# Patient Record
Sex: MALE | Race: ASIAN | NOT HISPANIC OR LATINO | Employment: FULL TIME | URBAN - NONMETROPOLITAN AREA
[De-identification: names, ages, dates, MRNs, and addresses within clinical notes are randomized per-mention and may not be internally consistent; named-entity substitution may affect disease eponyms.]

---

## 2019-01-21 ENCOUNTER — TELEPHONE (OUTPATIENT)
Dept: INTERNAL MEDICINE | Facility: CLINIC | Age: 44
End: 2019-01-21

## 2019-01-21 NOTE — TELEPHONE ENCOUNTER
Patients wife is scheduled on 3/1/19 at 945 and pt had to schedule that next week, he is wondering if you would be able to work them both in on the same day.  It doesn't matter which day, just would need 2 new patient appointments back to back if possible.    Patients phone # [phone]

## 2019-03-01 ENCOUNTER — OFFICE VISIT (OUTPATIENT)
Dept: INTERNAL MEDICINE | Facility: CLINIC | Age: 44
End: 2019-03-01

## 2019-03-01 VITALS
BODY MASS INDEX: 27.77 KG/M2 | WEIGHT: 205 LBS | TEMPERATURE: 98.3 F | SYSTOLIC BLOOD PRESSURE: 169 MMHG | HEIGHT: 72 IN | HEART RATE: 59 BPM | OXYGEN SATURATION: 98 % | DIASTOLIC BLOOD PRESSURE: 108 MMHG

## 2019-03-01 DIAGNOSIS — K21.9 GASTROESOPHAGEAL REFLUX DISEASE, ESOPHAGITIS PRESENCE NOT SPECIFIED: ICD-10-CM

## 2019-03-01 DIAGNOSIS — J32.0 CHRONIC MAXILLARY SINUSITIS: ICD-10-CM

## 2019-03-01 DIAGNOSIS — Z00.00 ROUTINE GENERAL MEDICAL EXAMINATION AT A HEALTH CARE FACILITY: ICD-10-CM

## 2019-03-01 DIAGNOSIS — I10 HTN (HYPERTENSION), BENIGN: Primary | ICD-10-CM

## 2019-03-01 LAB
ALBUMIN SERPL-MCNC: 4.8 G/DL (ref 3.5–5)
ALBUMIN/GLOB SERPL: 1.7 G/DL (ref 1–2)
ALP SERPL-CCNC: 86 U/L (ref 38–126)
ALT SERPL-CCNC: 45 U/L (ref 13–69)
AST SERPL-CCNC: 39 U/L (ref 15–46)
BILIRUB SERPL-MCNC: 0.9 MG/DL (ref 0.2–1.3)
BUN SERPL-MCNC: 11 MG/DL (ref 7–20)
BUN/CREAT SERPL: 11 (ref 6.3–21.9)
CALCIUM SERPL-MCNC: 10.5 MG/DL (ref 8.4–10.2)
CHLORIDE SERPL-SCNC: 104 MMOL/L (ref 98–107)
CHOLEST SERPL-MCNC: 193 MG/DL (ref 0–199)
CO2 SERPL-SCNC: 29 MMOL/L (ref 26–30)
CREAT SERPL-MCNC: 1 MG/DL (ref 0.6–1.3)
ERYTHROCYTE [DISTWIDTH] IN BLOOD BY AUTOMATED COUNT: 13.5 % (ref 11.5–14.5)
GLOBULIN SER CALC-MCNC: 2.8 GM/DL
GLUCOSE SERPL-MCNC: 88 MG/DL (ref 74–98)
HCT VFR BLD AUTO: 51.1 % (ref 42–52)
HDLC SERPL-MCNC: 43 MG/DL (ref 40–60)
HGB BLD-MCNC: 16.4 G/DL (ref 14–18)
LDLC SERPL CALC-MCNC: 118 MG/DL (ref 0–99)
MCH RBC QN AUTO: 26.3 PG (ref 27–31)
MCHC RBC AUTO-ENTMCNC: 32.1 G/DL (ref 30–37)
MCV RBC AUTO: 82 FL (ref 80–94)
PLATELET # BLD AUTO: 288 10*3/MM3 (ref 130–400)
POTASSIUM SERPL-SCNC: 4.5 MMOL/L (ref 3.5–5.1)
PROT SERPL-MCNC: 7.6 G/DL (ref 6.3–8.2)
RBC # BLD AUTO: 6.23 10*6/MM3 (ref 4.7–6.1)
SODIUM SERPL-SCNC: 141 MMOL/L (ref 137–145)
TRIGL SERPL-MCNC: 162 MG/DL
VLDLC SERPL CALC-MCNC: 32.4 MG/DL
WBC # BLD AUTO: 6.33 10*3/MM3 (ref 4.8–10.8)

## 2019-03-01 PROCEDURE — 99386 PREV VISIT NEW AGE 40-64: CPT | Performed by: INTERNAL MEDICINE

## 2019-03-01 PROCEDURE — 93000 ELECTROCARDIOGRAM COMPLETE: CPT | Performed by: INTERNAL MEDICINE

## 2019-03-01 RX ORDER — OMEPRAZOLE 20 MG/1
20 CAPSULE, DELAYED RELEASE ORAL DAILY
Qty: 30 CAPSULE | Refills: 2 | Status: SHIPPED | OUTPATIENT
Start: 2019-03-01 | End: 2021-09-01 | Stop reason: DRUGHIGH

## 2019-03-01 RX ORDER — AMLODIPINE BESYLATE 5 MG/1
5 TABLET ORAL DAILY
Qty: 30 TABLET | Refills: 5 | Status: SHIPPED | OUTPATIENT
Start: 2019-03-01 | End: 2019-04-08 | Stop reason: SDUPTHER

## 2019-03-01 RX ORDER — FLUTICASONE PROPIONATE 50 MCG
1 SPRAY, SUSPENSION (ML) NASAL DAILY
Qty: 1 BOTTLE | Refills: 5 | Status: SHIPPED | OUTPATIENT
Start: 2019-03-01 | End: 2019-03-31

## 2019-03-01 RX ORDER — CETIRIZINE HYDROCHLORIDE 10 MG/1
10 TABLET ORAL DAILY
Qty: 30 TABLET | Refills: 5 | Status: SHIPPED | OUTPATIENT
Start: 2019-03-01 | End: 2019-10-05

## 2019-03-01 NOTE — PROGRESS NOTES
Chief Complaint   Patient presents with   • Annual Exam     New patient physical - fasting today - acid reflux        MENDOZA Larson is a 43 y.o. male and is here for a comprehensive physical exam. The patient reports problems - ht burn, dyspnea.  Has had a history of hypertension in the past however home readings have usually shown slightly better control     History:  Erectile dysfunction  no  Nocturia  no      Do you take any herbs or supplements that were not prescribed by a doctor? no    Are you taking aspirin daily? no      Health Habits:  Dental Exam. up to date  Eye Exam. up to date  Exercise: 2 times/week.  Current exercise activities include: housecleaning    Health Maintenance   Topic Date Due   • ANNUAL PHYSICAL  11/30/1978   • TDAP/TD VACCINES (1 - Tdap) 11/30/1994   • INFLUENZA VACCINE  08/01/2018       PMH, PSH, SocHx, FamHx, Allergies, and Medications: Reviewed and updated in the Visit Navigator.     No Known Allergies  Past Medical History:   Diagnosis Date   • GERD (gastroesophageal reflux disease)      Past Surgical History:   Procedure Laterality Date   • KIDNEY STONE SURGERY  2011     Social History     Socioeconomic History   • Marital status:      Spouse name: Not on file   • Number of children: Not on file   • Years of education: Not on file   • Highest education level: Not on file   Social Needs   • Financial resource strain: Not on file   • Food insecurity - worry: Not on file   • Food insecurity - inability: Not on file   • Transportation needs - medical: Not on file   • Transportation needs - non-medical: Not on file   Occupational History   • Not on file   Tobacco Use   • Smoking status: Never Smoker   • Smokeless tobacco: Never Used   Substance and Sexual Activity   • Alcohol use: No     Frequency: Never   • Drug use: No   • Sexual activity: Defer   Other Topics Concern   • Not on file   Social History Narrative   • Not on file     Family History   Problem Relation Age of  "Onset   • Diabetes Mother    • Hypertension Mother    • Heart disease Mother    • Prostate cancer Father        Review of Systems  Review of Systems   Constitutional: Negative.  Negative for activity change, appetite change, fatigue and fever.   HENT: Negative for congestion, ear discharge, ear pain and trouble swallowing.    Eyes: Negative for photophobia and visual disturbance.   Respiratory: Negative for cough and shortness of breath.    Cardiovascular: Negative for chest pain and palpitations.   Gastrointestinal: Negative for abdominal distention, abdominal pain, constipation, diarrhea, nausea and vomiting.   Endocrine: Negative.    Genitourinary: Negative for dysuria, hematuria and urgency.   Musculoskeletal: Positive for arthralgias. Negative for back pain, joint swelling and myalgias.   Skin: Negative for color change and rash.   Allergic/Immunologic: Negative.    Neurological: Negative for dizziness, weakness, light-headedness and headaches.   Hematological: Negative for adenopathy. Does not bruise/bleed easily.   Psychiatric/Behavioral: Positive for sleep disturbance. Negative for agitation, confusion and dysphoric mood. The patient is nervous/anxious.        Vitals:    03/01/19 1018   BP: (!) 169/108   Pulse: 59   Temp: 98.3 °F (36.8 °C)   SpO2: 98%       Objective   BP (!) 169/108 Comment: AUTOMATIC  Pulse 59   Temp 98.3 °F (36.8 °C) (Temporal)   Ht 182.9 cm (72\")   Wt 93 kg (205 lb)   SpO2 98%   BMI 27.80 kg/m²     Physical Exam   Constitutional: He is oriented to person, place, and time. He appears well-developed and well-nourished. No distress.   HENT:   Nose: Nose normal.   Mouth/Throat: Oropharynx is clear and moist.   Eyes: Conjunctivae and EOM are normal. No scleral icterus.   Neck: No tracheal deviation present. No thyromegaly present.   Cardiovascular: Normal rate and regular rhythm. Exam reveals no friction rub.   No murmur heard.  Pulmonary/Chest: No respiratory distress. He has no " wheezes. He has no rales.   Abdominal: Soft. He exhibits no distension and no mass. There is no tenderness. There is no guarding.   Musculoskeletal: Normal range of motion. He exhibits no deformity.   Lymphadenopathy:     He has no cervical adenopathy.   Neurological: He is alert and oriented to person, place, and time. He has normal reflexes. No cranial nerve deficit. Coordination normal.   Skin: Skin is warm and dry. No rash noted. No erythema.   Psychiatric: He has a normal mood and affect. His behavior is normal. Judgment and thought content normal.       The CVD Risk score (D'Agostino, et al., 2008) failed to calculate for the following reasons:    Cannot find a previous HDL lab    Cannot find a previous total cholesterol lab    No results found for: CHOL, CHLPL, TRIG, HDL, LDL, LDLDIRECT  No results found for: GLUCOSE, BUN, CREATININE, NA, K, CL, CO2, CALCIUM, PROTEINTOT, ALBUMIN, ALT, AST, ALKPHOS, BILITOT, EGFRIFNONA, LABIL2, BCR, ANIONGAP  No results found for: WBC, HGB, HCT, MCV, PLT    Assessment/Plan   1. Healthy male exam.    2. Patient Counseling: Including but not Limited to the following, when appropriate:  --Nutrition: Stressed importance of moderation in sodium/caffeine intake, saturated fat and cholesterol, caloric balance, sufficient intake of fresh fruits, vegetables, fiber  .--Discussed the issue of daily use of baby aspirin.  --Exercise: Stressed the importance of regular exercise.   --Substance Abuse: Discussed cessation/primary prevention of tobacco, alcohol, or other drug use; driving or other dangerous activities under the influence; availability of treatment for abuse, as indicated based on social history.    --Sexuality: Discussed sexually transmitted diseases, partner selection, use of condoms and contraceptive alternatives.   --Injury prevention: Discussed safety belts, safety helmets, smoke detector, smoking near bedding or upholstery.   --Dental health: Discussed importance of  regular tooth brushing, flossing, and dental visits.  --Immunizations reviewed.  --Discussed benefits of colon cancer screening.      3. Discussed the patient's BMI with him.  The BMI is in the acceptable range  4. No Follow-up on file.  5. Age-appropriate Screening Scheduled  6. There are no Patient Instructions on file for this visit.    Assessment/Plan     MENDOZA was seen today for annual exam.    Diagnoses and all orders for this visit:    HTN (hypertension), benign intermittent elevations noted counseled patient start amlodipine follow BP readings at home discussed DASH diet    Gastroesophageal reflux disease, esophagitis presence not specified discussed reflux precautions and proton pump inhibitors check routine lab work

## 2019-04-08 ENCOUNTER — OFFICE VISIT (OUTPATIENT)
Dept: INTERNAL MEDICINE | Facility: CLINIC | Age: 44
End: 2019-04-08

## 2019-04-08 VITALS
DIASTOLIC BLOOD PRESSURE: 97 MMHG | BODY MASS INDEX: 27.5 KG/M2 | WEIGHT: 203 LBS | HEIGHT: 72 IN | SYSTOLIC BLOOD PRESSURE: 162 MMHG | HEART RATE: 64 BPM | OXYGEN SATURATION: 99 % | TEMPERATURE: 97.8 F

## 2019-04-08 DIAGNOSIS — I10 HTN (HYPERTENSION), BENIGN: Primary | ICD-10-CM

## 2019-04-08 PROCEDURE — 99213 OFFICE O/P EST LOW 20 MIN: CPT | Performed by: INTERNAL MEDICINE

## 2019-04-08 RX ORDER — AMLODIPINE BESYLATE 5 MG/1
10 TABLET ORAL DAILY
Qty: 90 TABLET | Refills: 3 | Status: SHIPPED | OUTPATIENT
Start: 2019-04-08 | End: 2019-04-08 | Stop reason: SDUPTHER

## 2019-04-08 RX ORDER — AMLODIPINE BESYLATE 10 MG/1
10 TABLET ORAL DAILY
Qty: 90 TABLET | Refills: 3 | Status: SHIPPED | OUTPATIENT
Start: 2019-04-08 | End: 2019-10-25 | Stop reason: SDUPTHER

## 2019-04-08 NOTE — PROGRESS NOTES
"Alyx Larson is a 43 y.o. male    HPI recent diagnosis of hypertension for which she was placed on amlodipine coming in for follow-up has brought in some BP readings with him should which show better control but  not optimal.  No alcohol use is compliant with his dietary restrictions has been trying to lose weight with diet and exercise    The following portions of the patient's history were reviewed and updated as appropriate: allergies, current medications, past family history, past medical history, past social history, past surgical history, and problem list.     Review of Systems   Constitutional: Negative.  Negative for activity change, appetite change, fatigue and fever.   HENT: Negative for congestion, ear discharge, ear pain and trouble swallowing.    Eyes: Negative for photophobia and visual disturbance.   Respiratory: Negative for cough and shortness of breath.    Cardiovascular: Negative for chest pain and palpitations.   Gastrointestinal: Negative for abdominal distention, abdominal pain, constipation, diarrhea, nausea and vomiting.   Endocrine: Negative.    Genitourinary: Negative for dysuria, hematuria and urgency.   Musculoskeletal: Negative for arthralgias, back pain, joint swelling and myalgias.   Skin: Negative for color change and rash.   Allergic/Immunologic: Negative.    Neurological: Negative for dizziness, weakness, light-headedness and headaches.   Hematological: Negative for adenopathy. Does not bruise/bleed easily.   Psychiatric/Behavioral: Negative for agitation, confusion and dysphoric mood. The patient is not nervous/anxious.        Visit Vitals  /97 Comment: Automatic   Pulse 64   Temp 97.8 °F (36.6 °C) (Temporal)   Ht 182.9 cm (72\")   Wt 92.1 kg (203 lb)   SpO2 99%   BMI 27.53 kg/m²       Objective  Physical Exam   Constitutional: He is oriented to person, place, and time. He appears well-developed and well-nourished. No distress.   HENT:   Nose: Nose normal. "   Mouth/Throat: Oropharynx is clear and moist.   Eyes: Conjunctivae and EOM are normal. No scleral icterus.   Neck: No tracheal deviation present. No thyromegaly present.   Cardiovascular: Normal rate and regular rhythm. Exam reveals no friction rub.   No murmur heard.  Pulmonary/Chest: No respiratory distress. He has no wheezes. He has no rales.   Abdominal: Soft. He exhibits no distension and no mass. There is no tenderness. There is no guarding.   Musculoskeletal: Normal range of motion. He exhibits no deformity.   Lymphadenopathy:     He has no cervical adenopathy.   Neurological: He is alert and oriented to person, place, and time. He has normal reflexes. No cranial nerve deficit. Coordination normal.   Skin: Skin is warm and dry. No rash noted. No erythema.   Psychiatric: He has a normal mood and affect. His behavior is normal. Judgment and thought content normal.       Diagnoses and all orders for this visit:    HTN (hypertension), benign continue with low-salt diet amlodipine dose has been increased to 10 mg daily follow BP readings at home

## 2019-07-08 ENCOUNTER — OFFICE VISIT (OUTPATIENT)
Dept: INTERNAL MEDICINE | Facility: CLINIC | Age: 44
End: 2019-07-08

## 2019-07-08 VITALS
HEART RATE: 61 BPM | BODY MASS INDEX: 28.15 KG/M2 | SYSTOLIC BLOOD PRESSURE: 131 MMHG | WEIGHT: 207.8 LBS | DIASTOLIC BLOOD PRESSURE: 85 MMHG | OXYGEN SATURATION: 98 % | HEIGHT: 72 IN | TEMPERATURE: 98.3 F

## 2019-07-08 DIAGNOSIS — M25.562 CHRONIC PAIN OF BOTH KNEES: ICD-10-CM

## 2019-07-08 DIAGNOSIS — M25.561 CHRONIC PAIN OF BOTH KNEES: ICD-10-CM

## 2019-07-08 DIAGNOSIS — J33.9 NASAL POLYP: ICD-10-CM

## 2019-07-08 DIAGNOSIS — I10 HTN (HYPERTENSION), BENIGN: Primary | ICD-10-CM

## 2019-07-08 DIAGNOSIS — G89.29 CHRONIC PAIN OF BOTH KNEES: ICD-10-CM

## 2019-07-08 PROCEDURE — 99214 OFFICE O/P EST MOD 30 MIN: CPT | Performed by: INTERNAL MEDICINE

## 2019-07-08 RX ORDER — FLUTICASONE PROPIONATE 50 MCG
SPRAY, SUSPENSION (ML) NASAL
Refills: 0 | COMMUNITY
Start: 2019-05-05 | End: 2020-07-24

## 2019-07-08 RX ORDER — AZELASTINE 1 MG/ML
2 SPRAY, METERED NASAL 2 TIMES DAILY
Qty: 1 EACH | Refills: 12 | Status: SHIPPED | OUTPATIENT
Start: 2019-07-08 | End: 2019-10-05

## 2019-07-08 NOTE — PROGRESS NOTES
"Alyx Larson is a 43 y.o. male    HPI coming in for follow-up patient with hypertension with chronic rhinitis complains of nasal congestion has reflux esophagitis  Has had complains of bilateral knee pain especially with prolonged sitting.  No associated swelling no new trauma has not taken any medication for this    The following portions of the patient's history were reviewed and updated as appropriate: allergies, current medications, past family history, past medical history, past social history, past surgical history, and problem list.     Review of Systems   Constitutional: Negative.  Negative for activity change, appetite change, fatigue and fever.   HENT: Positive for postnasal drip, rhinorrhea, sinus pressure and sneezing. Negative for congestion, ear discharge, ear pain and trouble swallowing.    Eyes: Negative for photophobia and visual disturbance.   Respiratory: Negative for cough and shortness of breath.    Cardiovascular: Negative for chest pain and palpitations.   Gastrointestinal: Negative for abdominal distention, abdominal pain, constipation, diarrhea, nausea and vomiting.   Endocrine: Negative.    Genitourinary: Negative for dysuria, hematuria and urgency.   Musculoskeletal: Positive for arthralgias. Negative for back pain, joint swelling and myalgias.   Skin: Negative for color change and rash.   Allergic/Immunologic: Negative.    Neurological: Negative for dizziness, weakness, light-headedness and headaches.   Hematological: Negative for adenopathy. Does not bruise/bleed easily.   Psychiatric/Behavioral: Negative for agitation, confusion and dysphoric mood. The patient is not nervous/anxious.        Visit Vitals  /85 Comment: Automatic   Pulse 61   Temp 98.3 °F (36.8 °C) (Temporal)   Ht 182.9 cm (72\")   Wt 94.3 kg (207 lb 12.8 oz)   SpO2 98%   BMI 28.18 kg/m²       Objective  Physical Exam   Constitutional: He is oriented to person, place, and time. He appears well-developed " and well-nourished. No distress.   HENT:   Mouth/Throat: Oropharynx is clear and moist.   Rt sided nasal polyp   Eyes: Conjunctivae and EOM are normal. No scleral icterus.   Neck: No tracheal deviation present. No thyromegaly present.   Cardiovascular: Normal rate and regular rhythm. Exam reveals no friction rub.   No murmur heard.  Pulmonary/Chest: No respiratory distress. He has no wheezes. He has no rales.   Abdominal: Soft. He exhibits no distension and no mass. There is no tenderness. There is no guarding.   Musculoskeletal: Normal range of motion. He exhibits no deformity.   Clicking in his left patella on passive extension of the knee joint   Lymphadenopathy:     He has no cervical adenopathy.   Neurological: He is alert and oriented to person, place, and time. He has normal reflexes. No cranial nerve deficit. Coordination normal.   Skin: Skin is warm and dry. No rash noted. No erythema.   Psychiatric: He has a normal mood and affect. His behavior is normal. Judgment and thought content normal.       Diagnoses and all orders for this visit:    HTN (hypertension), benign needs better control home readings show suboptimal control    Chronic pain of both knees suspect patellofemoral syndrome advised exercises to help strengthen the quadricep muscles.  Demonstrated and discussed his exercises at home    Nasal polyp will continue with steroid nose spray.  Referral to allergist.    Other orders  -     fluticasone (FLONASE) 50 MCG/ACT nasal spray

## 2019-08-09 ENCOUNTER — OFFICE VISIT (OUTPATIENT)
Dept: INTERNAL MEDICINE | Facility: CLINIC | Age: 44
End: 2019-08-09

## 2019-08-09 VITALS
HEIGHT: 72 IN | TEMPERATURE: 97.4 F | HEART RATE: 60 BPM | OXYGEN SATURATION: 99 % | DIASTOLIC BLOOD PRESSURE: 94 MMHG | BODY MASS INDEX: 27.68 KG/M2 | WEIGHT: 204.4 LBS | SYSTOLIC BLOOD PRESSURE: 148 MMHG

## 2019-08-09 DIAGNOSIS — I10 HTN (HYPERTENSION), BENIGN: Primary | ICD-10-CM

## 2019-08-09 DIAGNOSIS — J31.0 CHRONIC RHINITIS: ICD-10-CM

## 2019-08-09 DIAGNOSIS — K21.9 GASTROESOPHAGEAL REFLUX DISEASE, ESOPHAGITIS PRESENCE NOT SPECIFIED: ICD-10-CM

## 2019-08-09 PROCEDURE — 99214 OFFICE O/P EST MOD 30 MIN: CPT | Performed by: INTERNAL MEDICINE

## 2019-08-09 RX ORDER — KETOCONAZOLE 20 MG/G
CREAM TOPICAL DAILY
Qty: 30 G | Refills: 0 | Status: SHIPPED | OUTPATIENT
Start: 2019-08-09 | End: 2020-07-24

## 2019-08-09 RX ORDER — FUROSEMIDE 20 MG/1
20 TABLET ORAL DAILY PRN
Qty: 20 TABLET | Refills: 1 | Status: SHIPPED | OUTPATIENT
Start: 2019-08-09 | End: 2019-10-05

## 2019-08-09 NOTE — PROGRESS NOTES
"Alyx Larson is a 43 y.o. male    HPI coming in for follow-up patient with severe rhinitis with chronic nasal stuffiness which affects his sleep.  A trial of fluticasone nose spray has helped him by about 10 to 20%.  He is also using an Astelin nose spray.  He has hypertension for which she is on amlodipine.  Recent complaints of lower extremity swelling while he was traveling    The following portions of the patient's history were reviewed and updated as appropriate: allergies, current medications, past family history, past medical history, past social history, past surgical history, and problem list.     Review of Systems   Constitutional: Negative.  Negative for activity change, appetite change, fatigue and fever.   HENT: Positive for congestion. Negative for ear discharge, ear pain and trouble swallowing.    Eyes: Negative for photophobia and visual disturbance.   Respiratory: Negative for cough and shortness of breath.    Cardiovascular: Positive for leg swelling. Negative for chest pain and palpitations.   Gastrointestinal: Negative for abdominal distention, abdominal pain, constipation, diarrhea, nausea and vomiting.   Endocrine: Negative.    Genitourinary: Negative for dysuria, hematuria and urgency.   Musculoskeletal: Negative for arthralgias, back pain, joint swelling and myalgias.   Skin: Negative for color change and rash.   Allergic/Immunologic: Negative.    Neurological: Negative for dizziness, weakness, light-headedness and headaches.   Hematological: Negative for adenopathy. Does not bruise/bleed easily.   Psychiatric/Behavioral: Negative for agitation, confusion and dysphoric mood. The patient is not nervous/anxious.        Visit Vitals  /94 Comment: Automatic   Pulse 60   Temp 97.4 °F (36.3 °C) (Temporal)   Ht 182.9 cm (72\")   Wt 92.7 kg (204 lb 6.4 oz)   SpO2 99%   BMI 27.72 kg/m²       Objective  Physical Exam   Constitutional: He is oriented to person, place, and time. He " appears well-developed and well-nourished. No distress.   HENT:   Nose: Nose normal.   Mouth/Throat: Oropharynx is clear and moist.   Eyes: Conjunctivae and EOM are normal. No scleral icterus.   Neck: No tracheal deviation present. No thyromegaly present.   Cardiovascular: Normal rate and regular rhythm. Exam reveals no friction rub.   No murmur heard.  Pulmonary/Chest: No respiratory distress. He has no wheezes. He has no rales.   Abdominal: Soft. He exhibits no distension and no mass. There is no tenderness. There is no guarding.   Musculoskeletal: Normal range of motion. He exhibits no deformity.   Lymphadenopathy:     He has no cervical adenopathy.   Neurological: He is alert and oriented to person, place, and time. He has normal reflexes. No cranial nerve deficit. Coordination normal.   Skin: Skin is warm and dry. No rash noted. No erythema.   Hyperpigmented rash in axillary region   Psychiatric: He has a normal mood and affect. His behavior is normal. Judgment and thought content normal.       Diagnoses and all orders for this visit:    HTN (hypertension), benign BP readings at home show good control.  Discussed side effects of amlodipine discussed leg elevation and as needed low-dose Lasix only if needed for edema    Chronic rhinitis continue with steroid and a Stelazine nose spray along with Zyrtec at nighttime.  Awaiting appointment with allergist.  Consider ENT consult if not better    Gastroesophageal reflux disease, esophagitis presence not specified continue reflux precautions and proton pump inhibitors

## 2019-10-03 ENCOUNTER — TELEPHONE (OUTPATIENT)
Dept: INTERNAL MEDICINE | Facility: CLINIC | Age: 44
End: 2019-10-03

## 2019-10-03 NOTE — TELEPHONE ENCOUNTER
Patient is having pain in both heels when he is walking.  Patient will be out of town next week prefers to be seen asap - tomorrow or Monday.  Please call patient to be worked in if possible for Dr. Larson.  Patient refused appt with extender and first Larson opening was 10/22/19

## 2019-10-05 ENCOUNTER — OFFICE VISIT (OUTPATIENT)
Dept: INTERNAL MEDICINE | Facility: CLINIC | Age: 44
End: 2019-10-05

## 2019-10-05 VITALS
HEART RATE: 63 BPM | RESPIRATION RATE: 16 BRPM | BODY MASS INDEX: 25.73 KG/M2 | WEIGHT: 190 LBS | HEIGHT: 72 IN | DIASTOLIC BLOOD PRESSURE: 82 MMHG | OXYGEN SATURATION: 99 % | SYSTOLIC BLOOD PRESSURE: 126 MMHG | TEMPERATURE: 98.4 F

## 2019-10-05 DIAGNOSIS — M79.671 BILATERAL FOOT PAIN: Primary | ICD-10-CM

## 2019-10-05 DIAGNOSIS — M79.672 BILATERAL FOOT PAIN: Primary | ICD-10-CM

## 2019-10-05 PROCEDURE — 99213 OFFICE O/P EST LOW 20 MIN: CPT | Performed by: INTERNAL MEDICINE

## 2019-10-05 RX ORDER — MONTELUKAST SODIUM 10 MG/1
10 TABLET ORAL DAILY
Refills: 0 | COMMUNITY
Start: 2019-09-28 | End: 2020-07-24

## 2019-10-05 RX ORDER — EPINEPHRINE 0.3 MG/.3ML
0.3 INJECTION SUBCUTANEOUS ONCE AS NEEDED
Refills: 0 | COMMUNITY
Start: 2019-08-15 | End: 2020-07-24

## 2019-10-05 NOTE — PROGRESS NOTES
"Alyx Larson is a 43 y.o. male    HPI coming in for evaluation of bilateral ankle pain right more than the left side has had bony deformity in the posterior part of the ankle since childhood.  Denies direct trauma.  He mentions worsening of this pain over the last 1 week after he increased his walking regimen.  In the past he has done some stretching exercises of his calf muscles with some relief has started icing that area intermittently    The following portions of the patient's history were reviewed and updated as appropriate: allergies, current medications, past family history, past medical history, past social history, past surgical history, and problem list.     Review of Systems   Constitutional: Negative.  Negative for activity change, appetite change, fatigue and fever.   HENT: Positive for congestion. Negative for ear discharge, ear pain and trouble swallowing.    Eyes: Negative for photophobia and visual disturbance.   Respiratory: Negative for cough and shortness of breath.    Cardiovascular: Negative for chest pain and palpitations.   Gastrointestinal: Negative for abdominal distention, abdominal pain, constipation, diarrhea, nausea and vomiting.   Endocrine: Negative.    Genitourinary: Negative for dysuria, hematuria and urgency.   Musculoskeletal: Positive for arthralgias. Negative for back pain, joint swelling and myalgias.   Skin: Negative for color change and rash.   Allergic/Immunologic: Negative.    Neurological: Negative for dizziness, weakness, light-headedness and headaches.   Hematological: Negative for adenopathy. Does not bruise/bleed easily.   Psychiatric/Behavioral: Negative for agitation, confusion and dysphoric mood. The patient is not nervous/anxious.        Visit Vitals  /82   Pulse 63   Temp 98.4 °F (36.9 °C)   Resp 16   Ht 182.9 cm (72\")   Wt 86.2 kg (190 lb)   SpO2 99%   BMI 25.77 kg/m²       Objective  Physical Exam   Constitutional: He is oriented to person, " place, and time. He appears well-developed and well-nourished. No distress.   HENT:   Nose: Nose normal.   Mouth/Throat: Oropharynx is clear and moist.   Eyes: Conjunctivae and EOM are normal. No scleral icterus.   Neck: No tracheal deviation present. No thyromegaly present.   Cardiovascular: Normal rate and regular rhythm. Exam reveals no friction rub.   No murmur heard.  Pulmonary/Chest: No respiratory distress. He has no wheezes. He has no rales.   Abdominal: Soft. He exhibits no distension and no mass. There is no tenderness. There is no guarding.   Musculoskeletal: Normal range of motion. He exhibits no deformity.        Right foot: There is tenderness and bony tenderness.   Lymphadenopathy:     He has no cervical adenopathy.   Neurological: He is alert and oriented to person, place, and time. He has normal reflexes. No cranial nerve deficit. Coordination normal.   Skin: Skin is warm and dry. No rash noted. No erythema.   Psychiatric: He has a normal mood and affect. His behavior is normal. Judgment and thought content normal.       Diagnoses and all orders for this visit:    Bilateral foot pain suspect bilateral Achilles tendinopathy.  Discussed rest for at least a week after which we can start a stretching regimen he wants to hold off on physical therapy consult.  Discussed intermittent icing will use Voltaren gel as needed discussed appropriate footwear.  Ortho referral if not better    Other orders    -     diclofenac (VOLTAREN) 1 % gel gel; Apply 4 g topically to the appropriate area as directed 4 (Four) Times a Day As Needed (pain).

## 2019-10-25 RX ORDER — AMLODIPINE BESYLATE 10 MG/1
10 TABLET ORAL DAILY
Qty: 90 TABLET | Refills: 3 | Status: SHIPPED | OUTPATIENT
Start: 2019-10-25 | End: 2020-07-24 | Stop reason: SDUPTHER

## 2019-10-25 RX ORDER — AMLODIPINE BESYLATE 10 MG/1
TABLET ORAL
Qty: 45 TABLET | Refills: 3 | Status: SHIPPED | OUTPATIENT
Start: 2019-10-25 | End: 2021-01-18

## 2020-07-02 ENCOUNTER — TELEPHONE (OUTPATIENT)
Dept: INTERNAL MEDICINE | Facility: CLINIC | Age: 45
End: 2020-07-02

## 2020-07-02 NOTE — TELEPHONE ENCOUNTER
Scheduled patient for 7/24 @ 3:00     He will arrive at 9:00 fasting.     I am giving him the accommodations because his appointment has been changed twice.

## 2020-07-02 NOTE — TELEPHONE ENCOUNTER
PATIENT IS UPSET THAT HIS APPOINTMENT NEEDS TO BE CHANGED FOR A 2ND TIME AND  WANTS TO BE WORKED IN SOONER AND WANT A CALL BACK PLEASE ADVISE AND GIVE THE PATIENT A CALL THANKS

## 2020-07-24 ENCOUNTER — OFFICE VISIT (OUTPATIENT)
Dept: INTERNAL MEDICINE | Facility: CLINIC | Age: 45
End: 2020-07-24

## 2020-07-24 VITALS
BODY MASS INDEX: 25.71 KG/M2 | TEMPERATURE: 98 F | OXYGEN SATURATION: 99 % | WEIGHT: 189.8 LBS | HEART RATE: 65 BPM | DIASTOLIC BLOOD PRESSURE: 70 MMHG | HEIGHT: 72 IN | SYSTOLIC BLOOD PRESSURE: 110 MMHG

## 2020-07-24 DIAGNOSIS — Z00.00 ROUTINE GENERAL MEDICAL EXAMINATION AT A HEALTH CARE FACILITY: Primary | ICD-10-CM

## 2020-07-24 PROCEDURE — 99396 PREV VISIT EST AGE 40-64: CPT | Performed by: INTERNAL MEDICINE

## 2020-07-24 NOTE — PROGRESS NOTES
Chief Complaint   Patient presents with   • Annual Exam     fasting today        MENDOZA Larson is a 44 y.o. male and is here for a comprehensive physical exam. The patient reports no problems.     History:  Erectile dysfunction  no  Nocturia  no      Do you take any herbs or supplements that were not prescribed by a doctor? no    Are you taking aspirin daily? no      Health Habits:  Dental Exam. unknown  Eye Exam. unknown  Exercise: 3 times/week.  Current exercise activities include: gardening and walking    Health Maintenance   Topic Date Due   • HEPATITIS C SCREENING  01/21/2019   • ANNUAL PHYSICAL  03/02/2020   • TDAP/TD VACCINES (1 - Tdap) 08/09/2020 (Originally 11/30/1986)   • INFLUENZA VACCINE  08/01/2020       PMH, PSH, SocHx, FamHx, Allergies, and Medications: Reviewed and updated in the Visit Navigator.     No Known Allergies  Past Medical History:   Diagnosis Date   • GERD (gastroesophageal reflux disease)      Past Surgical History:   Procedure Laterality Date   • KIDNEY STONE SURGERY  2011     Social History     Socioeconomic History   • Marital status:      Spouse name: Not on file   • Number of children: Not on file   • Years of education: Not on file   • Highest education level: Not on file   Tobacco Use   • Smoking status: Never Smoker   • Smokeless tobacco: Never Used   Substance and Sexual Activity   • Alcohol use: No     Frequency: Never   • Drug use: No   • Sexual activity: Defer     Family History   Problem Relation Age of Onset   • Diabetes Mother    • Hypertension Mother    • Heart disease Mother    • Prostate cancer Father        Review of Systems  Review of Systems   Constitutional: Negative.  Negative for activity change, appetite change, fatigue and fever.   HENT: Negative for congestion, ear discharge, ear pain and trouble swallowing.    Eyes: Negative for photophobia and visual disturbance.   Respiratory: Negative for cough and shortness of breath.    Cardiovascular: Negative  "for chest pain and palpitations.   Gastrointestinal: Negative for abdominal distention, abdominal pain, constipation, diarrhea, nausea and vomiting.   Endocrine: Negative.    Genitourinary: Negative for dysuria, hematuria and urgency.   Musculoskeletal: Positive for arthralgias. Negative for back pain, joint swelling and myalgias.   Skin: Negative for color change and rash.   Allergic/Immunologic: Negative.    Neurological: Negative for dizziness, weakness, light-headedness and headaches.   Hematological: Negative for adenopathy. Does not bruise/bleed easily.   Psychiatric/Behavioral: Positive for sleep disturbance. Negative for agitation, confusion and dysphoric mood. The patient is not nervous/anxious.        Vitals:    07/24/20 1509   BP: 110/70   Pulse: 65   Temp: 98 °F (36.7 °C)   SpO2: 99%       Objective   /70   Pulse 65   Temp 98 °F (36.7 °C) (Temporal)   Ht 182.9 cm (72\")   Wt 86.1 kg (189 lb 12.8 oz)   SpO2 99%   BMI 25.74 kg/m²     Physical Exam   Constitutional: He is oriented to person, place, and time. He appears well-developed and well-nourished. No distress.   HENT:   Nose: Nose normal.   Mouth/Throat: Oropharynx is clear and moist.   Eyes: Conjunctivae and EOM are normal. No scleral icterus.   Neck: No tracheal deviation present. No thyromegaly present.   Cardiovascular: Normal rate and regular rhythm. Exam reveals no friction rub.   No murmur heard.  Pulmonary/Chest: No respiratory distress. He has no wheezes. He has no rales.   Abdominal: Soft. He exhibits no distension and no mass. There is no tenderness. There is no guarding.   Musculoskeletal: Normal range of motion. He exhibits deformity.   Lymphadenopathy:     He has no cervical adenopathy.   Neurological: He is alert and oriented to person, place, and time. He has normal reflexes. No cranial nerve deficit. Coordination normal.   Skin: Skin is warm and dry. No rash noted. No erythema.   Psychiatric: He has a normal mood and " affect. His behavior is normal. Judgment and thought content normal.       The 10-year CVD risk score (VIRGIL'Agokatelin, et al., 2008) is: 6.3%    Values used to calculate the score:      Age: 44 years      Sex: Male      Diabetic: No      Tobacco smoker: No      Systolic Blood Pressure: 110 mmHg      Is BP treated: Yes      HDL Cholesterol: 43 mg/dL      Total Cholesterol: 193 mg/dL    Lab Results   Component Value Date    CHLPL 193 03/01/2019    TRIG 162 (H) 03/01/2019    HDL 43 03/01/2019     (H) 03/01/2019     BUN   Date Value Ref Range Status   03/01/2019 11 7 - 20 mg/dL Final     Creatinine   Date Value Ref Range Status   03/01/2019 1.00 0.60 - 1.30 mg/dL Final     Sodium   Date Value Ref Range Status   03/01/2019 141 137 - 145 mmol/L Final     Potassium   Date Value Ref Range Status   03/01/2019 4.5 3.5 - 5.1 mmol/L Final     Chloride   Date Value Ref Range Status   03/01/2019 104 98 - 107 mmol/L Final     Total CO2   Date Value Ref Range Status   03/01/2019 29.0 26.0 - 30.0 mmol/L Final     Calcium   Date Value Ref Range Status   03/01/2019 10.5 (H) 8.4 - 10.2 mg/dL Final     Albumin   Date Value Ref Range Status   03/01/2019 4.80 3.50 - 5.00 g/dL Final     ALT (SGPT)   Date Value Ref Range Status   03/01/2019 45 13 - 69 U/L Final     AST (SGOT)   Date Value Ref Range Status   03/01/2019 39 15 - 46 U/L Final     Alkaline Phosphatase   Date Value Ref Range Status   03/01/2019 86 38 - 126 U/L Final     Total Bilirubin   Date Value Ref Range Status   03/01/2019 0.9 0.2 - 1.3 mg/dL Final     eGFR Non  Am   Date Value Ref Range Status   03/01/2019 82 >60 mL/min/1.73 Final     A/G Ratio   Date Value Ref Range Status   03/01/2019 1.7 1.0 - 2.0 g/dL Final     BUN/Creatinine Ratio   Date Value Ref Range Status   03/01/2019 11.0 6.3 - 21.9 Final     Lab Results   Component Value Date    WBC 6.33 03/01/2019    HGB 16.4 03/01/2019    HCT 51.1 03/01/2019    MCV 82.0 03/01/2019     03/01/2019        Assessment/Plan   1. Healthy male exam.   2. Patient Counseling: Including but not Limited to the following, when appropriate:  --Nutrition: Stressed importance of moderation in sodium/caffeine intake, saturated fat and cholesterol, caloric balance, sufficient intake of fresh fruits, vegetables, fiber  .--Discussed the issue of daily use of baby aspirin.  --Exercise: Stressed the importance of regular exercise.   --Substance Abuse: Discussed cessation/primary prevention of tobacco, alcohol, or other drug use; driving or other dangerous activities under the influence; availability of treatment for abuse, as indicated based on social history.    --Sexuality: Discussed sexually transmitted diseases, partner selection, use of condoms and contraceptive alternatives.   --Injury prevention: Discussed safety belts, safety helmets, smoke detector, smoking near bedding or upholstery.   --Dental health: Discussed importance of regular tooth brushing, flossing, and dental visits.  --Immunizations reviewed.        3. Discussed the patient's BMI with him.  The BMI is in the acceptable range  4. No follow-ups on file.  5. Age-appropriate Screening Scheduled  6. There are no Patient Instructions on file for this visit.    Assessment/Plan     MENDOZA was seen today for annual exam.    Diagnoses and all orders for this visit:    Routine general medical examination at a health care facility  -     Lipid Panel  -     Comprehensive Metabolic Panel  -     Hepatitis C Antibody    Other orders  -     Fluticasone Propionate (Xhance) 93 MCG/ACT Exhaler Suspension; 1 application into the nostril(s) as directed by provider 2 (two) times a day.    Steroid nose spray for nasal polyposis he is following up with ENT and allergist

## 2020-07-25 LAB
ALBUMIN SERPL-MCNC: 4.9 G/DL (ref 4–5)
ALBUMIN/GLOB SERPL: 2 {RATIO} (ref 1.2–2.2)
ALP SERPL-CCNC: 73 IU/L (ref 39–117)
ALT SERPL-CCNC: 18 IU/L (ref 0–44)
AST SERPL-CCNC: 18 IU/L (ref 0–40)
BILIRUB SERPL-MCNC: 0.5 MG/DL (ref 0–1.2)
BUN SERPL-MCNC: 11 MG/DL (ref 6–24)
BUN/CREAT SERPL: 11 (ref 9–20)
CALCIUM SERPL-MCNC: 9.9 MG/DL (ref 8.7–10.2)
CHLORIDE SERPL-SCNC: 101 MMOL/L (ref 96–106)
CHOLEST SERPL-MCNC: 189 MG/DL (ref 100–199)
CO2 SERPL-SCNC: 27 MMOL/L (ref 20–29)
CREAT SERPL-MCNC: 0.98 MG/DL (ref 0.76–1.27)
GLOBULIN SER CALC-MCNC: 2.4 G/DL (ref 1.5–4.5)
GLUCOSE SERPL-MCNC: 78 MG/DL (ref 65–99)
HCV AB S/CO SERPL IA: 0.1 S/CO RATIO (ref 0–0.9)
HDLC SERPL-MCNC: 40 MG/DL
LDLC SERPL CALC-MCNC: 123 MG/DL (ref 0–99)
POTASSIUM SERPL-SCNC: 4.6 MMOL/L (ref 3.5–5.2)
PROT SERPL-MCNC: 7.3 G/DL (ref 6–8.5)
SODIUM SERPL-SCNC: 141 MMOL/L (ref 134–144)
TRIGL SERPL-MCNC: 129 MG/DL (ref 0–149)
VLDLC SERPL CALC-MCNC: 26 MG/DL (ref 5–40)

## 2020-10-26 ENCOUNTER — OFFICE VISIT (OUTPATIENT)
Dept: INTERNAL MEDICINE | Facility: CLINIC | Age: 45
End: 2020-10-26

## 2020-10-26 VITALS
HEIGHT: 72 IN | HEART RATE: 74 BPM | TEMPERATURE: 98.4 F | DIASTOLIC BLOOD PRESSURE: 90 MMHG | WEIGHT: 197 LBS | BODY MASS INDEX: 26.68 KG/M2 | OXYGEN SATURATION: 100 % | SYSTOLIC BLOOD PRESSURE: 140 MMHG

## 2020-10-26 DIAGNOSIS — M54.42 ACUTE LEFT-SIDED LOW BACK PAIN WITH LEFT-SIDED SCIATICA: Primary | ICD-10-CM

## 2020-10-26 LAB
BILIRUB BLD-MCNC: NEGATIVE MG/DL
CLARITY, POC: CLEAR
COLOR UR: YELLOW
GLUCOSE UR STRIP-MCNC: NEGATIVE MG/DL
KETONES UR QL: NEGATIVE
LEUKOCYTE EST, POC: NEGATIVE
NITRITE UR-MCNC: NEGATIVE MG/ML
PH UR: 6 [PH] (ref 5–8)
PROT UR STRIP-MCNC: ABNORMAL MG/DL
RBC # UR STRIP: NEGATIVE /UL
SP GR UR: 1.03 (ref 1–1.03)
UROBILINOGEN UR QL: NORMAL

## 2020-10-26 PROCEDURE — 99213 OFFICE O/P EST LOW 20 MIN: CPT | Performed by: INTERNAL MEDICINE

## 2020-10-26 RX ORDER — NAPROXEN 500 MG/1
500 TABLET ORAL 2 TIMES DAILY WITH MEALS
Qty: 60 TABLET | Refills: 1 | Status: SHIPPED | OUTPATIENT
Start: 2020-10-26 | End: 2021-02-12

## 2020-10-26 RX ORDER — HYDROCODONE BITARTRATE AND ACETAMINOPHEN 5; 325 MG/1; MG/1
1 TABLET ORAL EVERY 12 HOURS PRN
Qty: 8 TABLET | Refills: 0 | Status: SHIPPED | OUTPATIENT
Start: 2020-10-26 | End: 2020-11-02

## 2020-10-26 NOTE — PROGRESS NOTES
"Alyx Larson is a 44 y.o. male    HPI 2-day history of low back pain with some radiation down his left leg without trauma has had similar complaints about a month ago and it appears to have resolved easily overnight.  There is been no change in activity    The following portions of the patient's history were reviewed and updated as appropriate: allergies, current medications, past family history, past medical history, past social history, past surgical history, and problem list.     Review of Systems   Constitutional: Negative.  Negative for activity change, appetite change, fatigue and fever.   HENT: Negative for congestion, ear discharge, ear pain and trouble swallowing.    Eyes: Negative for photophobia and visual disturbance.   Respiratory: Negative for cough and shortness of breath.    Cardiovascular: Negative for chest pain and palpitations.   Gastrointestinal: Negative for abdominal distention, abdominal pain, constipation, diarrhea, nausea and vomiting.   Endocrine: Negative.    Genitourinary: Negative for dysuria, hematuria and urgency.   Musculoskeletal: Positive for arthralgias and back pain. Negative for joint swelling and myalgias.   Skin: Negative for color change and rash.   Allergic/Immunologic: Negative.    Neurological: Negative for dizziness, weakness, light-headedness and headaches.   Hematological: Negative for adenopathy. Does not bruise/bleed easily.   Psychiatric/Behavioral: Negative for agitation, confusion and dysphoric mood. The patient is not nervous/anxious.        Visit Vitals  /90   Pulse 74   Temp 98.4 °F (36.9 °C) (Temporal)   Ht 182.9 cm (72\")   Wt 89.4 kg (197 lb)   SpO2 100%   BMI 26.72 kg/m²       Objective  Physical Exam  Constitutional:       General: He is not in acute distress.     Appearance: He is well-developed.   HENT:      Nose: Nose normal.   Eyes:      General: No scleral icterus.     Conjunctiva/sclera: Conjunctivae normal.   Neck:      Thyroid: " No thyromegaly.      Trachea: No tracheal deviation.   Cardiovascular:      Rate and Rhythm: Normal rate and regular rhythm.      Heart sounds: No murmur. No friction rub.   Pulmonary:      Effort: No respiratory distress.      Breath sounds: No wheezing or rales.   Abdominal:      General: There is no distension.      Palpations: Abdomen is soft. There is no mass.      Tenderness: There is no abdominal tenderness. There is no guarding.   Musculoskeletal: Normal range of motion.         General: No deformity.   Lymphadenopathy:      Cervical: No cervical adenopathy.   Skin:     General: Skin is warm and dry.      Findings: No erythema or rash.   Neurological:      Mental Status: He is alert and oriented to person, place, and time.      Cranial Nerves: No cranial nerve deficit.      Coordination: Coordination normal.      Deep Tendon Reflexes: Reflexes are normal and symmetric.   Psychiatric:         Behavior: Behavior normal.         Thought Content: Thought content normal.         Judgment: Judgment normal.         Diagnoses and all orders for this visit:    Acute left-sided low back pain with left-sided sciatica suspect L3-L4 radiculopathy discussed rest, heat pack.  Hydrocodone as needed only naproxen twice a day.  Imaging studies if not better in a few days

## 2020-10-27 ENCOUNTER — HOSPITAL ENCOUNTER (EMERGENCY)
Facility: HOSPITAL | Age: 45
Discharge: HOME | End: 2020-10-27
Attending: EMERGENCY MEDICINE | Admitting: EMERGENCY MEDICINE

## 2020-10-27 VITALS
SYSTOLIC BLOOD PRESSURE: 169 MMHG | HEART RATE: 78 BPM | DIASTOLIC BLOOD PRESSURE: 93 MMHG | RESPIRATION RATE: 20 BRPM | OXYGEN SATURATION: 96 % | HEIGHT: 74 IN | BODY MASS INDEX: 25.28 KG/M2 | WEIGHT: 197 LBS | TEMPERATURE: 98 F

## 2020-10-27 DIAGNOSIS — M54.32 SCIATICA OF LEFT SIDE: Primary | ICD-10-CM

## 2020-10-27 PROCEDURE — 99282 EMERGENCY DEPT VISIT SF MDM: CPT

## 2020-10-27 PROCEDURE — 25010000002 KETOROLAC TROMETHAMINE PER 15 MG: Performed by: PHYSICIAN ASSISTANT

## 2020-10-27 PROCEDURE — 25010000002 DEXAMETHASONE PER 1 MG: Performed by: PHYSICIAN ASSISTANT

## 2020-10-27 PROCEDURE — 96372 THER/PROPH/DIAG INJ SC/IM: CPT

## 2020-10-27 RX ORDER — CYCLOBENZAPRINE HCL 10 MG
10 TABLET ORAL 3 TIMES DAILY PRN
Qty: 9 TABLET | Refills: 0 | Status: SHIPPED | OUTPATIENT
Start: 2020-10-27 | End: 2020-10-30

## 2020-10-27 RX ORDER — PREDNISONE 50 MG/1
50 TABLET ORAL DAILY
Qty: 5 TABLET | Refills: 0 | Status: SHIPPED | OUTPATIENT
Start: 2020-10-27 | End: 2020-11-02

## 2020-10-27 RX ORDER — KETOROLAC TROMETHAMINE 30 MG/ML
30 INJECTION, SOLUTION INTRAMUSCULAR; INTRAVENOUS ONCE
Status: COMPLETED | OUTPATIENT
Start: 2020-10-27 | End: 2020-10-27

## 2020-10-27 RX ORDER — DEXAMETHASONE SODIUM PHOSPHATE 10 MG/ML
10 INJECTION INTRAMUSCULAR; INTRAVENOUS ONCE
Status: COMPLETED | OUTPATIENT
Start: 2020-10-27 | End: 2020-10-27

## 2020-10-27 RX ADMIN — KETOROLAC TROMETHAMINE 30 MG: 30 INJECTION, SOLUTION INTRAMUSCULAR; INTRAVENOUS at 21:39

## 2020-10-27 RX ADMIN — DEXAMETHASONE SODIUM PHOSPHATE 10 MG: 10 INJECTION INTRAMUSCULAR; INTRAVENOUS at 21:38

## 2020-11-02 ENCOUNTER — OFFICE VISIT (OUTPATIENT)
Dept: INTERNAL MEDICINE | Facility: CLINIC | Age: 45
End: 2020-11-02

## 2020-11-02 VITALS
HEIGHT: 74 IN | TEMPERATURE: 97.9 F | HEART RATE: 80 BPM | WEIGHT: 198.8 LBS | BODY MASS INDEX: 25.51 KG/M2 | OXYGEN SATURATION: 99 % | SYSTOLIC BLOOD PRESSURE: 130 MMHG | DIASTOLIC BLOOD PRESSURE: 70 MMHG

## 2020-11-02 DIAGNOSIS — M54.42 CHRONIC LEFT-SIDED LOW BACK PAIN WITH LEFT-SIDED SCIATICA: Primary | ICD-10-CM

## 2020-11-02 DIAGNOSIS — M54.42 ACUTE LEFT-SIDED LOW BACK PAIN WITH LEFT-SIDED SCIATICA: ICD-10-CM

## 2020-11-02 DIAGNOSIS — G89.29 CHRONIC LEFT-SIDED LOW BACK PAIN WITH LEFT-SIDED SCIATICA: Primary | ICD-10-CM

## 2020-11-02 PROCEDURE — 99213 OFFICE O/P EST LOW 20 MIN: CPT | Performed by: INTERNAL MEDICINE

## 2020-11-02 RX ORDER — HYDROCODONE BITARTRATE AND ACETAMINOPHEN 5; 325 MG/1; MG/1
1 TABLET ORAL EVERY 12 HOURS PRN
Qty: 16 TABLET | Refills: 0 | Status: SHIPPED | OUTPATIENT
Start: 2020-11-02 | End: 2021-03-04

## 2020-11-02 NOTE — PROGRESS NOTES
"Alyx Larson is a 44 y.o. male    HPI coming in for follow-up continues to have pain now radiating down to his left calf.  Initially started with back pain with radiation to his left buttock has received NSAIDs because of persisting pain he went to the emergency room where he was given a Medrol Dosepak.  No new trauma denies bladder or bowel symptoms    The following portions of the patient's history were reviewed and updated as appropriate: allergies, current medications, past family history, past medical history, past social history, past surgical history, and problem list.     Review of Systems   Constitutional: Negative.  Negative for activity change, appetite change, fatigue and fever.   HENT: Negative for congestion, ear discharge, ear pain and trouble swallowing.    Eyes: Negative for photophobia and visual disturbance.   Respiratory: Negative for cough and shortness of breath.    Cardiovascular: Negative for chest pain and palpitations.   Gastrointestinal: Negative for abdominal distention, abdominal pain, constipation, diarrhea, nausea and vomiting.   Endocrine: Negative.    Genitourinary: Negative for dysuria, hematuria and urgency.   Musculoskeletal: Positive for arthralgias, back pain and gait problem. Negative for joint swelling and myalgias.   Skin: Negative for color change and rash.   Allergic/Immunologic: Negative.    Neurological: Negative for dizziness, weakness, light-headedness and headaches.   Hematological: Negative for adenopathy. Does not bruise/bleed easily.   Psychiatric/Behavioral: Negative for agitation, confusion and dysphoric mood. The patient is not nervous/anxious.        Visit Vitals  /70   Pulse 80   Temp 97.9 °F (36.6 °C) (Temporal)   Ht 188 cm (74\")   Wt 90.2 kg (198 lb 12.8 oz)   SpO2 99%   BMI 25.52 kg/m²       Objective  Physical Exam  Constitutional:       General: He is not in acute distress.     Appearance: He is well-developed.   HENT:      Nose: Nose " normal.   Eyes:      General: No scleral icterus.     Conjunctiva/sclera: Conjunctivae normal.   Neck:      Thyroid: No thyromegaly.      Trachea: No tracheal deviation.   Cardiovascular:      Rate and Rhythm: Normal rate and regular rhythm.      Heart sounds: No murmur. No friction rub.   Pulmonary:      Effort: No respiratory distress.      Breath sounds: No wheezing or rales.   Abdominal:      General: There is no distension.      Palpations: Abdomen is soft. There is no mass.      Tenderness: There is no abdominal tenderness. There is no guarding.   Musculoskeletal: Normal range of motion.         General: No deformity.   Lymphadenopathy:      Cervical: No cervical adenopathy.   Skin:     General: Skin is warm and dry.      Findings: No erythema or rash.   Neurological:      Mental Status: He is alert and oriented to person, place, and time.      Cranial Nerves: No cranial nerve deficit.      Coordination: Coordination normal.      Deep Tendon Reflexes: Reflexes are normal and symmetric.   Psychiatric:         Behavior: Behavior normal.         Thought Content: Thought content normal.         Judgment: Judgment normal.         Diagnoses and all orders for this visit:    Chronic left-sided low back pain with left-sided sciatica persisting symptoms however somewhat better.  Due to significant pain and onset of symptoms more than a month ago will go ahead and set up an MRI of his LS spine.  Hydrocodone as needed only

## 2020-11-03 ENCOUNTER — TELEPHONE (OUTPATIENT)
Dept: INTERNAL MEDICINE | Facility: CLINIC | Age: 45
End: 2020-11-03

## 2020-11-03 NOTE — TELEPHONE ENCOUNTER
Patient informed the request is in process, he requested an outside facility and we have to get that approved with his insurance company.     He is aware that we will contact him with the appointment day and time

## 2020-11-03 NOTE — TELEPHONE ENCOUNTER
Caller: MENDOZA Larson    Relationship: Self    Best call back number:[phone]  What orders are you requesting (i.e. lab or imaging): MRI /spine    In what timeframe would the patient need to come in: ASAP    Where will you receive your lab/imaging services:     Additional notes: DISCUSSED REFERRAL AT LAST APPOINTMENT

## 2020-12-14 ENCOUNTER — OFFICE VISIT (OUTPATIENT)
Dept: INTERNAL MEDICINE | Facility: CLINIC | Age: 45
End: 2020-12-14

## 2020-12-14 VITALS
HEIGHT: 74 IN | OXYGEN SATURATION: 98 % | WEIGHT: 198 LBS | TEMPERATURE: 98.2 F | BODY MASS INDEX: 25.41 KG/M2 | HEART RATE: 89 BPM | SYSTOLIC BLOOD PRESSURE: 130 MMHG | DIASTOLIC BLOOD PRESSURE: 70 MMHG

## 2020-12-14 DIAGNOSIS — M79.605 PAIN OF LEFT LOWER EXTREMITY: Primary | ICD-10-CM

## 2020-12-14 PROCEDURE — 99213 OFFICE O/P EST LOW 20 MIN: CPT | Performed by: INTERNAL MEDICINE

## 2020-12-14 NOTE — PROGRESS NOTES
"Alyx Larson is a 45 y.o. male    HPI coming in for evaluation of follow-up on continuing left lower extremity pain appears to have improved somewhat now his symptoms occurred after he has been walking for a few minutes.  Complains of discomfort in his left calf region and left buttock.  Pain is not present when he is sitting or laying down.  Denies direct trauma.  Some discomfort reproduced on internal rotation of the left hip joint..  He has tried NSAIDs with some relief no history of tobacco use    The following portions of the patient's history were reviewed and updated as appropriate: allergies, current medications, past family history, past medical history, past social history, past surgical history, and problem list.     Review of Systems   Constitutional: Negative.  Negative for activity change, appetite change, fatigue and fever.   HENT: Negative for congestion, ear discharge, ear pain and trouble swallowing.    Eyes: Negative for photophobia and visual disturbance.   Respiratory: Negative for cough and shortness of breath.    Cardiovascular: Negative for chest pain and palpitations.   Gastrointestinal: Negative for abdominal distention, abdominal pain, constipation, diarrhea, nausea and vomiting.   Endocrine: Negative.    Genitourinary: Negative for dysuria, hematuria and urgency.   Musculoskeletal: Positive for arthralgias, back pain and gait problem. Negative for joint swelling and myalgias.   Skin: Negative for color change and rash.   Allergic/Immunologic: Negative.    Neurological: Negative for dizziness, weakness, light-headedness and headaches.   Hematological: Negative for adenopathy. Does not bruise/bleed easily.   Psychiatric/Behavioral: Negative for agitation, confusion and dysphoric mood. The patient is not nervous/anxious.        Visit Vitals  /70   Pulse 89   Temp 98.2 °F (36.8 °C) (Temporal)   Ht 188 cm (74\")   Wt 89.8 kg (198 lb)   SpO2 98%   BMI 25.42 kg/m² "       Objective  Physical Exam  Constitutional:       General: He is not in acute distress.     Appearance: He is well-developed.   HENT:      Nose: Nose normal.   Eyes:      General: No scleral icterus.     Conjunctiva/sclera: Conjunctivae normal.   Neck:      Thyroid: No thyromegaly.      Trachea: No tracheal deviation.   Cardiovascular:      Rate and Rhythm: Normal rate and regular rhythm.      Heart sounds: No murmur. No friction rub.   Pulmonary:      Effort: No respiratory distress.      Breath sounds: No wheezing or rales.   Abdominal:      General: There is no distension.      Palpations: Abdomen is soft. There is no mass.      Tenderness: There is no abdominal tenderness. There is no guarding.   Musculoskeletal: Normal range of motion.         General: No deformity.   Lymphadenopathy:      Cervical: No cervical adenopathy.   Skin:     General: Skin is warm and dry.      Findings: No erythema or rash.   Neurological:      Mental Status: He is alert and oriented to person, place, and time.      Cranial Nerves: No cranial nerve deficit.      Coordination: Coordination normal.      Deep Tendon Reflexes: Reflexes are normal and symmetric.   Psychiatric:         Behavior: Behavior normal.         Thought Content: Thought content normal.         Judgment: Judgment normal.         Diagnoses and all orders for this visit:    Pain of left lower extremity doubt PAD symptoms initially started with pain on standing or initiation of walking.  Suspect L3-L4 radiculopathy he wants to hold off on MRI.  Since it is improving gradually will pursue conservative measures if not better consider ankle-brachial index or arterial Doppler studies of his left leg.  Dorsalis pedis pulse shows good volume

## 2020-12-17 ENCOUNTER — TREATMENT (OUTPATIENT)
Dept: PHYSICAL THERAPY | Facility: CLINIC | Age: 45
End: 2020-12-17

## 2020-12-17 DIAGNOSIS — M79.605 LEFT LEG PAIN: ICD-10-CM

## 2020-12-17 DIAGNOSIS — M54.16 RADICULOPATHY, LUMBAR REGION: Primary | ICD-10-CM

## 2020-12-17 PROCEDURE — 97162 PT EVAL MOD COMPLEX 30 MIN: CPT | Performed by: PHYSICAL THERAPIST

## 2020-12-17 PROCEDURE — 97110 THERAPEUTIC EXERCISES: CPT | Performed by: PHYSICAL THERAPIST

## 2020-12-17 NOTE — PROGRESS NOTES
Physical Therapy Initial Evaluation and Plan of Care        Patient: MENDOZA Larson   : 1975  Diagnosis/ICD-10 Code:  Radiculopathy, lumbar region [M54.16]  Referring practitioner: Elliot Larson MD  Date of Initial Visit: 2020  Today's Date: 2020  Patient seen for 1 sessions           Subjective Questionnaire: LEFS: 47/80      Subjective Evaluation    History of Present Illness  Date of onset: 10/17/2020  Mechanism of injury: A few months ago he began to have insidious onset of back/hip pain and radiating into the left leg.  Was started on some medication which helped somewhat but the pain continues.  He notes minimal pain with sitting but pain with walking and going up stairs.  The pain is worse along the left calf but he also has pain in the left hip and lower back.  Pain subsides after a few minutes of sitting or lying down.   He reports initially that he had pain with walking shorter distances but has been able to walk further but the severity is about the same as it was at onset.      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 Pain  Current pain ratin  At best pain ratin  At worst pain ratin  Location: back left hip and left calf.  Quality: tight, sharp, burning and dull ache  Relieving factors: change in position, rest and support  Aggravating factors: lifting, stairs, ambulation and standing  Progression: improved    Diagnostic Tests  No diagnostic tests performed    Treatments  Previous treatment: medication  Current treatment: medication  Patient Goals  Patient goals for therapy: decreased pain, return to sport/leisure activities and increased strength             Objective          Postural Observations    Additional Postural Observation Details  Upright posture, relative flat back appearance.    Tenderness     Additional Tenderness Details  TTP along the left piriformis and SIJ.  TTP along the lumbosacral paraspinals with hypertrophy.    Neurological Testing      Sensation     Lumbar   Left   Intact: light touch    Right   Intact: light touch    Reflexes   Left   Patellar (L4): normal (2+)  Achilles (S1): normal (2+)    Right   Patellar (L4): normal (2+)  Achilles (S1): normal (2+)    Active Range of Motion     Lumbar   Flexion: 70 degrees with pain  Extension: 15 degrees     Strength/Myotome Testing     Left Hip   Planes of Motion   Flexion: 5  Extension: 3+  Abduction: 3+    Right Hip   Planes of Motion   Flexion: 5  Extension: 3+  Abduction: 3+    Left Knee   Flexion: 5  Extension: 5    Right Knee   Flexion: 5  Extension: 5    Left Ankle/Foot   Dorsiflexion: 4+  Great toe extension: 3    Right Ankle/Foot   Dorsiflexion: 5  Great toe extension: 4+    Tests       Thoracic   Positive slump.     Lumbar   Positive repeated extension.     Left   Positive passive SLR (around 60 degrees).     Right   Negative passive SLR.     Left Pelvic Girdle/Sacrum   Positive: sacral spring.   Negative: thigh thrust.     Ambulation     Comments   Decreased stride length on level ground, minimal antalgia.  Minimal deficits on stairs noted.          Assessment & Plan     Assessment  Impairments: abnormal or restricted ROM, activity intolerance, impaired physical strength, lacks appropriate home exercise program, pain with function and weight-bearing intolerance  Assessment details: Patient presents with LLE pain along the calf, hip and lower back.  He has some motor changes along the great toe extensors and signs and symptoms considered with L5 nerve root compression.  He improved great toe extension strength after prone press ups but didn't respond well with extension in standing. 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  Prognosis: good  Functional Limitations: carrying objects, lifting, walking, pulling, pushing, uncomfortable because of pain, sitting, standing, stooping and unable to perform repetitive  tasks  Goals  Plan Goals: Short Term Goals (2 weeks)  1. Patient to be compliant with initial HEP  2.  Patient to report pain less than or equal to 4/10 when present in the leg  3.  Patient to report decreased pain with forward trunk flexion to improve ability to lift from the floor.    Long Term Goals (4-6 weeks)  1. Patient to demonstrate lumbar flexion ROM to at least 80 degrees.  2. Patient to demonstrate decreased reactivity with passive intervertebral motion assessment posterior to anterior along the lumbosacral.  3. Patient to demonstrate independence with home exercises.  4. Patient to work an entire shift without onset of LLE symptoms.  5. Patient to improve LEFS score to at least 60/80.      Plan  Therapy options: will be seen for skilled physical therapy services  Planned modality interventions: electrical stimulation/Russian stimulation, TENS, ultrasound, traction, dry needling and thermotherapy (hydrocollator packs)  Planned therapy interventions: manual therapy, therapeutic activities, postural training, body mechanics training, functional ROM exercises, flexibility, gait training, stretching, strengthening, joint mobilization, neuromuscular re-education, soft tissue mobilization and spinal/joint mobilization  Frequency: 1x week  Duration in visits: 8  Treatment plan discussed with: patient  Plan details: Patient to be seen 1x/wk up to 8 weeks for progression of extension based exercises, neuraldynamics, core and hip strengthening, manual and modalities as indicated based on pain and function.        Timed:  Manual Therapy:    0     mins  64579;  Therapeutic Exercise:    13     mins  07313;     Neuromuscular Alberto:    0    mins  41028;    Therapeutic Activity:     0     mins  56710;     Gait Trainin     mins  30065;     Ultrasound:     0     mins  74184;    Electrical Stimulation:    0     mins  94049 ( );    Untimed:  Electrical Stimulation:    0     mins  71995 ( );  Mechanical  Traction:    0     mins  50200;     Timed Treatment:   13   mins   Total Treatment:     40   mins    PT SIGNATURE: Akira Quevedo, PT   DATE TREATMENT INITIATED: 12/17/2020    Initial Certification  Certification Period: 3/17/2021  I certify that the therapy services are furnished while this patient is under my care.  The services outlined above are required by this patient, and will be reviewed every 90 days.     PHYSICIAN: Elliot Larson MD      DATE:     Please sign and return via fax to [phone].. Thank you, [facility].

## 2020-12-28 ENCOUNTER — TREATMENT (OUTPATIENT)
Dept: PHYSICAL THERAPY | Facility: CLINIC | Age: 45
End: 2020-12-28

## 2020-12-28 DIAGNOSIS — M54.16 RADICULOPATHY, LUMBAR REGION: Primary | ICD-10-CM

## 2020-12-28 DIAGNOSIS — M79.605 LEFT LEG PAIN: ICD-10-CM

## 2020-12-28 PROCEDURE — 97140 MANUAL THERAPY 1/> REGIONS: CPT | Performed by: PHYSICAL THERAPIST

## 2020-12-28 PROCEDURE — 97110 THERAPEUTIC EXERCISES: CPT | Performed by: PHYSICAL THERAPIST

## 2020-12-28 NOTE — PROGRESS NOTES
Physical Therapy Daily Progress Note        Patient: MENDOZA Larson   : 1975  Diagnosis/ICD-10 Code:  Radiculopathy, lumbar region [M54.16]  Referring practitioner: Elliot Larson MD  Date of Initial Visit: Type: THERAPY  Noted: 2020  Today's Date: 2020  Patient seen for 2 sessions         Patient reports that his walking tolerance is much improved.  He was able to walk around [place] this weekend without much incident.  He notes he is noticing the great toe extension weakness since evaluation brought it to his attention.  He is pleased with his pain improvements. His pain level is 0-1/10 upon arrival.          Objective   Gr Toe extension 3/5 initially improves to 4/5 post treatment.    See Exercise, Manual, and Modality Logs for complete treatment.       Assessment & Plan     Assessment  Assessment details: Patient demonstrates improved LE symptoms overall and gradual improvement of great toe extension during the session.  He is compliant with his HEP and seems to be improving.    Plan  Plan details: Add standing core strengthening to tolerance.          Timed:  Manual Therapy:    10     mins  44511;  Therapeutic Exercise:    31     mins  13731;     Neuromuscular Alberto:    0    mins  39208;    Therapeutic Activity:     0     mins  50425;     Gait Trainin     mins  82508;     Ultrasound:     0     mins  57557;    Electrical Stimulation:    0     mins  01283 ( );    Untimed:  Electrical Stimulation:    0     mins  81256 ( );  Mechanical Traction:    0     mins  96517;     Timed Treatment:   41   mins   Total Treatment:     41   mins  Akira Quevdeo, PT  Physical Therapist

## 2021-01-06 ENCOUNTER — TREATMENT (OUTPATIENT)
Dept: PHYSICAL THERAPY | Facility: CLINIC | Age: 46
End: 2021-01-06

## 2021-01-06 DIAGNOSIS — M79.605 LEFT LEG PAIN: ICD-10-CM

## 2021-01-06 DIAGNOSIS — M54.16 RADICULOPATHY, LUMBAR REGION: Primary | ICD-10-CM

## 2021-01-06 PROCEDURE — 97110 THERAPEUTIC EXERCISES: CPT | Performed by: PHYSICAL THERAPIST

## 2021-01-06 NOTE — PROGRESS NOTES
Physical Therapy Daily Progress Note        Patient: MENDOZA Larson   : 1975  Diagnosis/ICD-10 Code:  Radiculopathy, lumbar region [M54.16]  Referring practitioner: Elliot Larson MD  Date of Initial Visit: Type: THERAPY  Noted: 2020  Today's Date: 2021  Patient seen for 3 sessions         Patient reports that his pain symptoms are reduced by about 90%.  When he is walking a lot or sitting a lot he has symptoms in the lower leg and foot but they immediately go away with position changes.  He notes that he feels therapy is helping him. His pain level is 0/10 upon arrival.          Objective   Left great to extension 3+/5  See Exercise, Manual, and Modality Logs for complete treatment.       Assessment & Plan     Assessment  Assessment details: Patient is progressing well.  He still has some transient nerve symptoms that are present but are not highly irritable.  He is doing quite well overall.    Plan  Plan details: Add sidestep/zigzags.            Timed:  Manual Therapy:    0     mins  18971;  Therapeutic Exercise:    40     mins  91433;     Neuromuscular Alberto:    0    mins  92956;    Therapeutic Activity:     0     mins  77690;     Gait Trainin     mins  34476;     Ultrasound:     0     mins  60058;    Electrical Stimulation:    0     mins  54484 ( );    Untimed:  Electrical Stimulation:    0     mins  57040 ( );  Mechanical Traction:    0     mins  40429;     Timed Treatment:   40   mins   Total Treatment:     40   mins  Akira Quevedo, PT  Physical Therapist

## 2021-01-13 ENCOUNTER — TREATMENT (OUTPATIENT)
Dept: PHYSICAL THERAPY | Facility: CLINIC | Age: 46
End: 2021-01-13

## 2021-01-13 DIAGNOSIS — M79.605 LEFT LEG PAIN: ICD-10-CM

## 2021-01-13 DIAGNOSIS — M54.16 RADICULOPATHY, LUMBAR REGION: Primary | ICD-10-CM

## 2021-01-13 PROCEDURE — 97110 THERAPEUTIC EXERCISES: CPT | Performed by: PHYSICAL THERAPIST

## 2021-01-13 PROCEDURE — 97140 MANUAL THERAPY 1/> REGIONS: CPT | Performed by: PHYSICAL THERAPIST

## 2021-01-13 NOTE — PROGRESS NOTES
Physical Therapy Daily Progress Note        Patient: MENDOZA Larson   : 1975  Diagnosis/ICD-10 Code:  Radiculopathy, lumbar region [M54.16]  Referring practitioner: Elliot Larson MD  Date of Initial Visit: Type: THERAPY  Noted: 2020  Today's Date: 2021  Patient seen for 4 sessions         Patient reports that he has been having a little more incidence of numbness in the left posterior leg but it is much better.  He has been walking longer and standing.  When he gets leg symptoms they are of short duration.  He works on his exercises 2x/day. His pain level is 2/10 upon arrival and is mainly along the proximal calf reported as a tightness.          Objective   SLR 60 degrees positive for pain in the left leg.  Left great toe extension 4/5 today.    See Exercise, Manual, and Modality Logs for complete treatment.       Assessment & Plan     Assessment  Assessment details: Patient demonstrates improving overall irritability of symptoms and is progressing towards his goals.  He is slowly regaining great toe extension strength and his symptoms are transient and mild at this point.    Plan  Plan details: Reassessment.          Timed:  Manual Therapy:    10     mins  53088;  Therapeutic Exercise:    31     mins  65291;     Neuromuscular Alberto:    0    mins  48709;    Therapeutic Activity:     0     mins  34015;     Gait Trainin     mins  87613;     Ultrasound:     0     mins  79671;    Electrical Stimulation:    0     mins  06410 ( );    Untimed:  Electrical Stimulation:    0     mins  88312 ( );  Mechanical Traction:    0     mins  85386;     Timed Treatment:   41   mins   Total Treatment:     41   mins  Akira Quevedo, PT  Physical Therapist

## 2021-01-18 RX ORDER — AMLODIPINE BESYLATE 10 MG/1
TABLET ORAL
Qty: 90 TABLET | Refills: 3 | Status: SHIPPED | OUTPATIENT
Start: 2021-01-18

## 2021-01-20 ENCOUNTER — TREATMENT (OUTPATIENT)
Dept: PHYSICAL THERAPY | Facility: CLINIC | Age: 46
End: 2021-01-20

## 2021-01-20 DIAGNOSIS — M79.605 LEFT LEG PAIN: ICD-10-CM

## 2021-01-20 DIAGNOSIS — M54.16 RADICULOPATHY, LUMBAR REGION: Primary | ICD-10-CM

## 2021-01-20 PROCEDURE — 97110 THERAPEUTIC EXERCISES: CPT | Performed by: PHYSICAL THERAPIST

## 2021-01-20 PROCEDURE — 97530 THERAPEUTIC ACTIVITIES: CPT | Performed by: PHYSICAL THERAPIST

## 2021-01-20 NOTE — PROGRESS NOTES
Re-Assessment / Re-Certification        Patient: MENDOZA Larson   : 1975  Diagnosis/ICD-10 Code:  Radiculopathy, lumbar region [M54.16]  Referring practitioner: Elliot Larson MD  Date of Initial Visit: Type: THERAPY  Noted: 2020  Today's Date: 2021  Patient seen for 5 sessions      Subjective:     Subjective Questionnaire: LEFS: 60/80  Clinical Progress: improved  Home Program Compliance: Yes  Treatment has included: therapeutic exercise, manual therapy and therapeutic activity    Subjective Evaluation    History of Present Illness    Subjective comment: Patient reports significant overall improvement in his symptoms and notes that the pain is less severe and more intermittent.  He still notes tingling after prolonged sitting that is followed by onset of calf pain but it subsides relatively easily.  Pain  Current pain ratin  At best pain ratin  At worst pain rating: 3         Objective          Postural Observations    Additional Postural Observation Details  Upright posture, relative flat back appearance.    Tenderness     Additional Tenderness Details  TTP along the left piriformis and SIJ.  Guarding with only mild tenderness of the lumbar paraspinals.    Neurological Testing     Sensation     Lumbar   Left   Intact: light touch    Right   Intact: light touch    Reflexes   Left   Patellar (L4): normal (2+)  Achilles (S1): normal (2+)    Right   Patellar (L4): normal (2+)  Achilles (S1): normal (2+)    Active Range of Motion     Lumbar   Flexion: 80 degrees   Extension: 20 degrees     Strength/Myotome Testing     Left Hip   Planes of Motion   Flexion: 5  Extension: 4+  Abduction: 4    Right Hip   Planes of Motion   Flexion: 5  Extension: 4+  Abduction: 4    Left Knee   Flexion: 5  Extension: 5    Right Knee   Flexion: 5  Extension: 5    Left Ankle/Foot   Dorsiflexion: 5  Great toe extension: 4    Right Ankle/Foot   Dorsiflexion: 5  Great toe extension: 4+    Tests       Thoracic    Negative slump.     Lumbar   Positive repeated extension.     Left   Positive passive SLR (around 75 degrees).     Right   Negative passive SLR.     Left Pelvic Girdle/Sacrum   Positive: sacral spring.   Negative: thigh thrust.     Ambulation     Comments   Decreased stride length on level ground, minimal antalgia.  Minimal deficits on stairs noted.      Assessment & Plan     Assessment  Impairments: abnormal or restricted ROM, activity intolerance, impaired physical strength, lacks appropriate home exercise program, pain with function and weight-bearing intolerance  Assessment details: Patient presents with improving LLE pain along the calf, hip and lower back. He is HEP compliant and notes improved tolerance to walking and overall activity.  He is still mildly irritable in regards to tingling after prolonged sitting but that is improving and he is able to do most daily activities.  He could have some calf weakness and achilles tendinitis underlying, however he demonstrates improvements in great toe extension from evaluation.  Prognosis: good  Functional Limitations: carrying objects, lifting, walking, pulling, pushing, uncomfortable because of pain, sitting, standing, stooping and unable to perform repetitive tasks  Goals  Plan Goals: Short Term Goals (2 weeks) ALL MET  1. Patient to be compliant with initial HEP  2.  Patient to report pain less than or equal to 4/10 when present in the leg  3.  Patient to report decreased pain with forward trunk flexion to improve ability to lift from the floor.    Long Term Goals (4-6 weeks) MET1-3 and 5.  1. Patient to demonstrate lumbar flexion ROM to at least 80 degrees.  2. Patient to demonstrate decreased reactivity with passive intervertebral motion assessment posterior to anterior along the lumbosacral.  3. Patient to demonstrate independence with home exercises.  4. Patient to work an entire shift without onset of LLE symptoms.  5. Patient to improve LEFS score to at  least 60/80.      Plan  Therapy options: will be seen for skilled physical therapy services  Planned modality interventions: electrical stimulation/Russian stimulation, TENS, ultrasound, dry needling and thermotherapy (hydrocollator packs)  Planned therapy interventions: manual therapy, therapeutic activities, postural training, body mechanics training, functional ROM exercises, flexibility, gait training, stretching, strengthening, joint mobilization, neuromuscular re-education, soft tissue mobilization and spinal/joint mobilization  Frequency: 1x week  Duration in visits: 3  Treatment plan discussed with: patient  Plan details: Patient to be seen 1x/wk up to and additional 3 weeks for finalizing HEP with neural dyanmics calf, hip, core strengthening. Manual and modalities as indicated.      Progress toward previous goals: Partially Met          Timeframe: 3 weeks  Prognosis to achieve goals: good    PT Signature: Akira Quevedo, PT      Based upon review of the patient's progress and continued therapy plan, it is my medical opinion that MENDOZA Larson should continue physical therapy treatment at [facility]  [address] [place] [id number] [phone].    Signature: __________________________________  Elliot Larson MD    Timed:  Manual Therapy:    0     mins  86185;  Therapeutic Exercise:    26     mins  61583;     Neuromuscular Alberto:    0    mins  24664;    Therapeutic Activity:     15     mins  84336;     Gait Trainin     mins  82169;     Ultrasound:     0     mins  52319;    Electrical Stimulation:    0     mins  57849 ( );    Untimed:  Electrical Stimulation:    0     mins  70045 ( );  Mechanical Traction:    0     mins  35886;     Timed Treatment:   41   mins   Total Treatment:     41   mins

## 2021-01-27 ENCOUNTER — TREATMENT (OUTPATIENT)
Dept: PHYSICAL THERAPY | Facility: CLINIC | Age: 46
End: 2021-01-27

## 2021-01-27 DIAGNOSIS — M79.605 LEFT LEG PAIN: ICD-10-CM

## 2021-01-27 DIAGNOSIS — M54.16 RADICULOPATHY, LUMBAR REGION: Primary | ICD-10-CM

## 2021-01-27 PROCEDURE — 97110 THERAPEUTIC EXERCISES: CPT | Performed by: PHYSICAL THERAPIST

## 2021-01-27 PROCEDURE — 97140 MANUAL THERAPY 1/> REGIONS: CPT | Performed by: PHYSICAL THERAPIST

## 2021-01-27 NOTE — PROGRESS NOTES
Physical Therapy Daily Progress Note        Patient: MENDOZA Larson   : 1975  Diagnosis/ICD-10 Code:  Radiculopathy, lumbar region [M54.16]  Referring practitioner: Elliot Larson MD  Date of Initial Visit: Type: THERAPY  Noted: 2020  Today's Date: 2021  Patient seen for 6 sessions         Patient reports that he still has intermittent N/T when he is sitting for a long time and takes his first steps but it only lasts a few minutes.  With prolonged walking he has pain in the calf.  He also notes increased crepitus along the ankle when he is moving the ankle around in the mornings. His pain level is 0/10 upon arrival.          Objective   Great toe extension 4/5.  Decreased LLE calf tone noted on palpation.    See Exercise, Manual, and Modality Logs for complete treatment.       Assessment & Plan     Assessment  Assessment details: Patient is progressing well overall. It seems like he is dealing with some residual weakness in her lower leg as the cause of most of his remaining complaints.  Reviewed lower leg strengthening exercises with him today and I think if that pain improves he should have a resolution of his symptoms overall.    Plan  Plan details: Add standing ankle strengthening and attempt jumping for strengthening.            Timed:  Manual Therapy:    10     mins  25450;  Therapeutic Exercise:    28     mins  89391;     Neuromuscular Alberto:    0    mins  40530;    Therapeutic Activity:     0     mins  21948;     Gait Trainin     mins  62903;     Ultrasound:     0     mins  81711;    Electrical Stimulation:    0     mins  97281 ( );    Untimed:  Electrical Stimulation:    0     mins  32770 ( );  Mechanical Traction:    0     mins  99182;     Timed Treatment:   38   mins   Total Treatment:     38   mins  Akira Quevedo, PT  Physical Therapist

## 2021-02-03 ENCOUNTER — TREATMENT (OUTPATIENT)
Dept: PHYSICAL THERAPY | Facility: CLINIC | Age: 46
End: 2021-02-03

## 2021-02-03 DIAGNOSIS — M79.605 LEFT LEG PAIN: ICD-10-CM

## 2021-02-03 DIAGNOSIS — M54.16 RADICULOPATHY, LUMBAR REGION: Primary | ICD-10-CM

## 2021-02-03 PROCEDURE — 97140 MANUAL THERAPY 1/> REGIONS: CPT | Performed by: PHYSICAL THERAPIST

## 2021-02-03 PROCEDURE — 97110 THERAPEUTIC EXERCISES: CPT | Performed by: PHYSICAL THERAPIST

## 2021-02-03 NOTE — PROGRESS NOTES
Physical Therapy Daily Progress Note        Patient: MENDOZA Larson   : 1975  Diagnosis/ICD-10 Code:  Radiculopathy, lumbar region [M54.16]  Referring practitioner: Elliot Larson MD  Date of Initial Visit: Type: THERAPY  Noted: 2020  Today's Date: 2/3/2021  Patient seen for 7 sessions         Patient reports that he began having a little more pain in the hip and lower leg pain since being a car a lot over the weekend.  Pain was radiating into the calf intermittently but less severe than previous.  This was a little discouraging to him. His pain level is 3/10 upon arrival.          Objective   Centralization achieved with repeated extension in lying.  TTP with some guarding along the left lumbar paraspinals.  Great toe extension 4+/5 LLE    See Exercise, Manual, and Modality Logs for complete treatment.       Assessment & Plan     Assessment  Assessment details: Patient's pain centralizes again with extension and he is left with pain in the calf that is likely due to chronic weakness in the lower leg region from nerve compression.  As long as he can keep symptoms centralized, he should be able to improve this strength which will improve his overall tolerance.      Plan  Plan details: Attempt recreational activities replication such as jumping and begin d/c discussion.          Timed:  Manual Therapy:    12     mins  19959;  Therapeutic Exercise:    27     mins  39999;     Neuromuscular Alberto:    0    mins  38139;    Therapeutic Activity:     0     mins  00496;     Gait Trainin     mins  97318;     Ultrasound:     0     mins  65824;    Electrical Stimulation:    0     mins  06692 ( );    Untimed:  Electrical Stimulation:    0     mins  55321 ( );  Mechanical Traction:    0     mins  07462;     Timed Treatment:   39   mins   Total Treatment:     39   mins  Akira Quevedo PT  Physical Therapist

## 2021-02-12 ENCOUNTER — OFFICE VISIT (OUTPATIENT)
Dept: INTERNAL MEDICINE | Facility: CLINIC | Age: 46
End: 2021-02-12

## 2021-02-12 VITALS
SYSTOLIC BLOOD PRESSURE: 120 MMHG | DIASTOLIC BLOOD PRESSURE: 70 MMHG | WEIGHT: 196.4 LBS | OXYGEN SATURATION: 98 % | BODY MASS INDEX: 25.21 KG/M2 | HEIGHT: 74 IN | HEART RATE: 75 BPM | TEMPERATURE: 98.2 F

## 2021-02-12 DIAGNOSIS — I10 HTN (HYPERTENSION), BENIGN: ICD-10-CM

## 2021-02-12 DIAGNOSIS — G89.29 CHRONIC LEFT-SIDED LOW BACK PAIN WITH LEFT-SIDED SCIATICA: Primary | ICD-10-CM

## 2021-02-12 DIAGNOSIS — M54.42 CHRONIC LEFT-SIDED LOW BACK PAIN WITH LEFT-SIDED SCIATICA: Primary | ICD-10-CM

## 2021-02-12 PROCEDURE — 99213 OFFICE O/P EST LOW 20 MIN: CPT | Performed by: INTERNAL MEDICINE

## 2021-02-12 RX ORDER — NAPROXEN 500 MG/1
500 TABLET ORAL 2 TIMES DAILY WITH MEALS
COMMUNITY
End: 2021-09-01

## 2021-02-12 NOTE — PROGRESS NOTES
Aylx Larson is a 45 y.o. male    HPI coming in with recurrence of low back pain with some radiation towards his left buttock.  Has had this problem intermittently for more than 2 months now he was scheduled for an MRI however this was put on hold due to cost reasons by the patient.  He did show significant improvement with NSAIDs and narcotic analgesics.  He received physical therapy with continued improvement.  Recent symptoms started about 5 days ago.  Denies direct trauma or change in activity.  He is using NSAIDs as needed and a heat pad as needed  Has hypertension    The following portions of the patient's history were reviewed and updated as appropriate: allergies, current medications, past family history, past medical history, past social history, past surgical history, and problem list.     Review of Systems   Constitutional: Negative.  Negative for activity change, appetite change, fatigue and fever.   HENT: Negative for congestion, ear discharge, ear pain and trouble swallowing.    Eyes: Negative for photophobia and visual disturbance.   Respiratory: Negative for cough and shortness of breath.    Cardiovascular: Negative for chest pain and palpitations.   Gastrointestinal: Negative for abdominal distention, abdominal pain, constipation, diarrhea, nausea and vomiting.   Endocrine: Negative.    Genitourinary: Negative for dysuria, hematuria and urgency.   Musculoskeletal: Positive for arthralgias, back pain and gait problem. Negative for joint swelling and myalgias.   Skin: Negative for color change and rash.   Allergic/Immunologic: Negative.    Neurological: Negative for dizziness, weakness, light-headedness and headaches.   Hematological: Negative for adenopathy. Does not bruise/bleed easily.   Psychiatric/Behavioral: Positive for sleep disturbance. Negative for agitation, confusion and dysphoric mood. The patient is not nervous/anxious.        Visit Vitals  /70   Pulse 75   Temp 98.2  "°F (36.8 °C) (Infrared)   Ht 188 cm (74\")   Wt 89.1 kg (196 lb 6.4 oz)   SpO2 98%   BMI 25.22 kg/m²       Objective  Physical Exam  Constitutional:       General: He is not in acute distress.     Appearance: He is well-developed.   HENT:      Nose: Nose normal.   Eyes:      General: No scleral icterus.     Conjunctiva/sclera: Conjunctivae normal.   Neck:      Thyroid: No thyromegaly.      Trachea: No tracheal deviation.   Cardiovascular:      Rate and Rhythm: Normal rate and regular rhythm.      Heart sounds: No murmur. No friction rub.   Pulmonary:      Effort: No respiratory distress.      Breath sounds: No wheezing or rales.   Abdominal:      General: There is no distension.      Palpations: Abdomen is soft. There is no mass.      Tenderness: There is no abdominal tenderness. There is no guarding.   Musculoskeletal: Normal range of motion.         General: Tenderness present. No deformity.   Lymphadenopathy:      Cervical: No cervical adenopathy.   Skin:     General: Skin is warm and dry.      Findings: No erythema or rash.   Neurological:      Mental Status: He is alert and oriented to person, place, and time.      Cranial Nerves: No cranial nerve deficit.      Coordination: Coordination normal.      Deep Tendon Reflexes: Reflexes are normal and symmetric.   Psychiatric:         Behavior: Behavior normal.         Thought Content: Thought content normal.         Judgment: Judgment normal.         Diagnoses and all orders for this visit:    Chronic left-sided low back pain with left-sided sciatica continue with conservative therapy however due to duration of symptoms intermittently will go ahead and set up patient for an MRI study no significant gait abnormality noted  -     MRI Lumbar Spine Without Contrast; Future    HTN (hypertension), benign able with current meds and low-salt diet    Other orders  -     naproxen (NAPROSYN) 500 MG tablet; Take 500 mg by mouth 2 (Two) Times a Day With Meals.        "

## 2021-02-26 ENCOUNTER — PATIENT MESSAGE (OUTPATIENT)
Dept: INTERNAL MEDICINE | Facility: CLINIC | Age: 46
End: 2021-02-26

## 2021-02-26 NOTE — TELEPHONE ENCOUNTER
From: MENDOZA Caldera  To: Elliot Caldera MD  Sent: 2/26/2021 11:13 AM EST  Subject: Test Results Question    i have MRI on 03/02/2021 and they say reuslt come same day. i need to set follow up withdr. caldera or call me about next step . let me know

## 2021-03-04 ENCOUNTER — OFFICE VISIT (OUTPATIENT)
Dept: INTERNAL MEDICINE | Facility: CLINIC | Age: 46
End: 2021-03-04

## 2021-03-04 VITALS
WEIGHT: 196.8 LBS | OXYGEN SATURATION: 98 % | SYSTOLIC BLOOD PRESSURE: 130 MMHG | HEART RATE: 100 BPM | TEMPERATURE: 98 F | HEIGHT: 74 IN | BODY MASS INDEX: 25.26 KG/M2 | DIASTOLIC BLOOD PRESSURE: 80 MMHG

## 2021-03-04 DIAGNOSIS — G89.29 CHRONIC MIDLINE LOW BACK PAIN WITH LEFT-SIDED SCIATICA: Primary | ICD-10-CM

## 2021-03-04 DIAGNOSIS — M54.42 CHRONIC MIDLINE LOW BACK PAIN WITH LEFT-SIDED SCIATICA: Primary | ICD-10-CM

## 2021-03-04 PROCEDURE — 99213 OFFICE O/P EST LOW 20 MIN: CPT | Performed by: INTERNAL MEDICINE

## 2021-03-04 NOTE — PROGRESS NOTES
Alyx Larson is a 45 y.o. male    HPI coming in for follow-up patient with back pain with radiation down his left leg especially.  Had some improvement with physical therapy and NSAIDs but then had a recurrence ongoing problems for at least 3 months now subsequently underwent MRI imaging studies which show significant L4-L5 disc protrusion along with spinal stenosis and nerve root impingement.  Pain not present when he is sitting or laying down makes ambulation difficult for him beyond a distance of about 50 feet.  He says he had developed some weakness in his left foot but this has improved    The following portions of the patient's history were reviewed and updated as appropriate: allergies, current medications, past family history, past medical history, past social history, past surgical history, and problem list.     Review of Systems   Constitutional: Negative.  Negative for activity change, appetite change, fatigue and fever.   HENT: Negative for congestion, ear discharge, ear pain and trouble swallowing.    Eyes: Negative for photophobia and visual disturbance.   Respiratory: Negative for cough and shortness of breath.    Cardiovascular: Negative for chest pain and palpitations.   Gastrointestinal: Negative for abdominal distention, abdominal pain, constipation, diarrhea, nausea and vomiting.   Endocrine: Negative.    Genitourinary: Negative for dysuria, hematuria and urgency.   Musculoskeletal: Positive for back pain and gait problem. Negative for arthralgias, joint swelling and myalgias.   Skin: Negative for color change and rash.   Allergic/Immunologic: Negative.    Neurological: Negative for dizziness, weakness, light-headedness and headaches.   Hematological: Negative for adenopathy. Does not bruise/bleed easily.   Psychiatric/Behavioral: Negative for agitation, confusion and dysphoric mood. The patient is not nervous/anxious.        Visit Vitals  /80   Pulse 100   Temp 98 °F (36.7  "°C) (Infrared)   Ht 188 cm (74\")   Wt 89.3 kg (196 lb 12.8 oz)   SpO2 98%   BMI 25.27 kg/m²       Objective  Physical Exam  Constitutional:       General: He is not in acute distress.     Appearance: He is well-developed.   HENT:      Nose: Nose normal.   Eyes:      General: No scleral icterus.     Conjunctiva/sclera: Conjunctivae normal.   Neck:      Thyroid: No thyromegaly.      Trachea: No tracheal deviation.   Cardiovascular:      Rate and Rhythm: Normal rate and regular rhythm.      Heart sounds: No murmur. No friction rub.   Pulmonary:      Effort: No respiratory distress.      Breath sounds: No wheezing or rales.   Abdominal:      General: There is no distension.      Palpations: Abdomen is soft. There is no mass.      Tenderness: There is no abdominal tenderness. There is no guarding.   Musculoskeletal: Normal range of motion.         General: No deformity.   Lymphadenopathy:      Cervical: No cervical adenopathy.   Skin:     General: Skin is warm and dry.      Findings: No erythema or rash.   Neurological:      Mental Status: He is alert and oriented to person, place, and time.      Cranial Nerves: No cranial nerve deficit.      Coordination: Coordination normal.      Deep Tendon Reflexes: Reflexes are normal and symmetric.   Psychiatric:         Behavior: Behavior normal.         Thought Content: Thought content normal.         Judgment: Judgment normal.         Diagnoses and all orders for this visit:    Chronic midline low back pain with left-sided sciatica with significant abnormalities on MRI referral to neurosurgery for possible surgical intervention continue with the other conservative measures for now        "

## 2021-03-24 ENCOUNTER — OFFICE VISIT (OUTPATIENT)
Dept: NEUROSURGERY | Facility: CLINIC | Age: 46
End: 2021-03-24

## 2021-03-24 VITALS
RESPIRATION RATE: 16 BRPM | HEIGHT: 72 IN | HEART RATE: 66 BPM | WEIGHT: 197.4 LBS | SYSTOLIC BLOOD PRESSURE: 118 MMHG | TEMPERATURE: 97.8 F | DIASTOLIC BLOOD PRESSURE: 76 MMHG | BODY MASS INDEX: 26.74 KG/M2 | OXYGEN SATURATION: 100 %

## 2021-03-24 DIAGNOSIS — M54.32 LEFT SIDED SCIATICA: Primary | ICD-10-CM

## 2021-03-24 DIAGNOSIS — M54.9 CHRONIC NECK AND BACK PAIN: ICD-10-CM

## 2021-03-24 DIAGNOSIS — G89.29 CHRONIC NECK AND BACK PAIN: ICD-10-CM

## 2021-03-24 DIAGNOSIS — M54.2 CHRONIC NECK AND BACK PAIN: ICD-10-CM

## 2021-03-24 PROCEDURE — 99203 OFFICE O/P NEW LOW 30 MIN: CPT | Performed by: NEUROLOGICAL SURGERY

## 2021-03-24 RX ORDER — GABAPENTIN 300 MG/1
300 CAPSULE ORAL 3 TIMES DAILY
Qty: 90 CAPSULE | Refills: 0 | Status: SHIPPED | OUTPATIENT
Start: 2021-03-24 | End: 2021-09-01 | Stop reason: SDUPTHER

## 2021-03-24 NOTE — PROGRESS NOTES
Subjective     Chief Complaint: Back pain    Patient ID: Lenka Larson is a 45 y.o. male seen for consultation today at the request of  Elliot Larson MD    Back Pain  Associated symptoms include leg pain. Pertinent negatives include no abdominal pain, chest pain, dysuria, fever, headaches, numbness or weakness.   Leg Pain   Pertinent negatives include no numbness.       This is a 45-year-old man who presents to my office with chief complaints of low back pain and left leg pain that started in October.  He denies antecedent trauma.  He is tried some physical therapy and states that this does not seem to be particularly helpful with regards to his low back pain.  His sciatica sounds like it was really bad last year but has gotten significantly better.  He now reports that his back pain accounts for approximately 70% of his discomfort, and his leg pain only 30%.    He does not have much in the way of medical comorbidities.  He denies alcohol or tobacco abuse although he is a social drinker.  His primary risk factors for low back pain include a sedentary lifestyle and prolonged sitting.  He reports approximately 6-7 hours of sitting per day.  This is in conjunction with no formal or regular exercise.    The following portions of the patient's history were reviewed and updated as appropriate: allergies, current medications, past family history, past medical history, past social history, past surgical history and problem list.    Family history:   Family History   Problem Relation Age of Onset   • Diabetes Mother    • Hypertension Mother    • Heart disease Mother    • Prostate cancer Father        Social history:   Social History     Socioeconomic History   • Marital status:      Spouse name: Not on file   • Number of children: Not on file   • Years of education: Not on file   • Highest education level: Not on file   Tobacco Use   • Smoking status: Never Smoker   • Smokeless tobacco: Never Used   Vaping Use   •  Vaping Use: Never used   Substance and Sexual Activity   • Alcohol use: No   • Drug use: No   • Sexual activity: Defer       Review of Systems   Constitutional: Negative for activity change, appetite change, chills, diaphoresis, fatigue, fever and unexpected weight change.   HENT: Negative for congestion, dental problem, drooling, ear discharge, ear pain, facial swelling, hearing loss, mouth sores, nosebleeds, postnasal drip, rhinorrhea, sinus pressure, sinus pain, sneezing, sore throat, tinnitus, trouble swallowing and voice change.    Eyes: Negative for photophobia, pain, discharge, redness, itching and visual disturbance.   Respiratory: Negative for apnea, cough, choking, chest tightness, shortness of breath, wheezing and stridor.    Cardiovascular: Negative for chest pain, palpitations and leg swelling.   Gastrointestinal: Negative for abdominal distention, abdominal pain, anal bleeding, blood in stool, constipation, diarrhea, nausea, rectal pain and vomiting.   Endocrine: Negative for cold intolerance, heat intolerance, polydipsia, polyphagia and polyuria.   Genitourinary: Negative for decreased urine volume, difficulty urinating, discharge, dysuria, enuresis, flank pain, frequency, genital sores, hematuria, penile pain, penile swelling, scrotal swelling, testicular pain and urgency.   Musculoskeletal: Positive for back pain and neck pain. Negative for arthralgias, gait problem, joint swelling, myalgias and neck stiffness.   Skin: Negative for color change, pallor, rash and wound.   Allergic/Immunologic: Negative for environmental allergies, food allergies and immunocompromised state.   Neurological: Positive for light-headedness. Negative for dizziness, tremors, seizures, syncope, facial asymmetry, speech difficulty, weakness, numbness and headaches.   Hematological: Negative for adenopathy. Does not bruise/bleed easily.   Psychiatric/Behavioral: Positive for sleep disturbance. Negative for agitation,  "behavioral problems, confusion, decreased concentration, dysphoric mood, hallucinations, self-injury and suicidal ideas. The patient is not nervous/anxious and is not hyperactive.        Objective   Blood pressure 118/76, pulse 66, temperature 97.8 °F (36.6 °C), resp. rate 16, height 182.9 cm (72\"), weight 89.5 kg (197 lb 6.4 oz), SpO2 100 %.  Body mass index is 26.77 kg/m².    Physical Exam  Vitals and nursing note reviewed.   Constitutional:       Appearance: He is well-developed. He is not toxic-appearing.   HENT:      Head: Normocephalic and atraumatic.      Right Ear: Hearing normal.      Left Ear: Hearing normal.      Nose: Nose normal.   Eyes:      General: Lids are normal.      Conjunctiva/sclera: Conjunctivae normal.      Pupils: Pupils are equal, round, and reactive to light.   Neck:      Vascular: No JVD.   Cardiovascular:      Rate and Rhythm: Normal rate and regular rhythm.      Pulses:           Radial pulses are 2+ on the right side and 2+ on the left side.   Pulmonary:      Effort: Pulmonary effort is normal. No respiratory distress.      Breath sounds: No stridor. No wheezing.   Musculoskeletal:      Cervical back: Normal range of motion.      Thoracic back: No swelling, spasms or tenderness.      Lumbar back: No swelling, spasms, tenderness or bony tenderness.      Comments: Muscle Group    L          R  Hip Flexor          5          5  Knee Extensor  5          5  ADF                   5          5  APF                   5          5  EHL                   5          5   Skin:     General: Skin is warm and dry.      Findings: No erythema or rash.   Neurological:      Mental Status: He is alert and oriented to person, place, and time.      GCS: GCS eye subscore is 4. GCS verbal subscore is 5. GCS motor subscore is 6.      Cranial Nerves: No cranial nerve deficit.      Sensory: No sensory deficit.      Motor: No abnormal muscle tone.      Deep Tendon Reflexes: Reflexes normal.      Reflex Scores:   "     Patellar reflexes are 1+ on the right side and 1+ on the left side.       Achilles reflexes are 1+ on the right side and 1+ on the left side.     Comments: Casual, toe raised, heel raised, and tandem gait all performed unremarkably.  Negative straight leg raise test bilaterally.   Psychiatric:         Behavior: Behavior normal.         Thought Content: Thought content normal.         Judgment: Judgment normal.         Assessment/Plan     Independent Review of Radiographic Studies:      Available for my review is an MRI of the lumbar spine which was performed on 3/2/2021.  This demonstrates annular tears at L4-5 and L5-S1.  There are broad-based disc bulges at L4-5 and L5-S1 which are superimposed upon a congenitally narrowed spinal canal this combines to cause severe lateral recess stenosis at L4-5 and L5-S1, potentially contributing to either L5 or S1 radiculopathy.  Do not appreciate any significant intraforaminal stenosis.  There is no evidence of spondylolisthesis.  There is mild facet arthropathy..  There is loss of lumbar lordosis.  There are no Modic endplate changes.    Medical Decision Making:      This is a 45-year-old man who presents to my office with a several month history of predominantly low back pain and occasional left-sided leg pain and what sounds like an S1 distribution.  He certainly has an MRI correlate which would explain his left S1 radiculopathy, however he reports that the majority of his  pain at this point is primarily due to to his back pain which she ascribes at a 70/30 ratio back:leg.    I like to try him on some gabapentin and an epidural steroid injection over the left S1 nerve root.  I will follow-up with him after his injection.  Surgical decompression may offer some relief with regards to his radicular pain, but is unlikely to help him with his axial low back pain which I think is due to a combination of his sedentary lifestyle and his lack of exercise.    Towards the end of  the visit he also was complaining of some numbness in his hands at night.  He attributes this primarily to sleeping with his elbow in a flexed position.  It may be the case that he has some ulnar neuropathy.  I would like for him to try some physical therapy for his low back pain and for his arms.  If he still having nighttime numbness then we can perform an EMG to determine if he has ulnar neuropathy or carpal tunnel syndrome.    Diagnoses and all orders for this visit:    1. Left sided sciatica (Primary)  -     gabapentin (NEURONTIN) 300 MG capsule; Take 1 capsule by mouth 3 (Three) Times a Day. 1 nightly for 3 days, 1 twice a day for 3 days, 1 three times a day thereafter  Dispense: 90 capsule; Refill: 0  -     Ambulatory Referral to Physical Therapy Evaluate and treat; Stretching, ROM, Strengthening  -     Ambulatory Referral to Pain Management    2. Chronic neck and back pain  -     gabapentin (NEURONTIN) 300 MG capsule; Take 1 capsule by mouth 3 (Three) Times a Day. 1 nightly for 3 days, 1 twice a day for 3 days, 1 three times a day thereafter  Dispense: 90 capsule; Refill: 0  -     Ambulatory Referral to Physical Therapy Evaluate and treat; Stretching, ROM, Strengthening  -     Ambulatory Referral to Pain Management        No follow-ups on file.           This document signed by MAI Pryor MD March 24, 2021 11:32 EDT

## 2021-04-07 NOTE — PROGRESS NOTES
"Chief Complaint: \"Lower back and left leg pain.\"          History of Present Illness:   Patient: Mr. Lenka Larson, 45 y.o. male   Referring Physician: Dr. Sandor Pryor  Reason for Referral: Consultation for chronic intractable lower back pain.   Pain History:  Patient reports a longer than 6-month history of progressive lower back pain, which began without incident.  Patient reports that in October 2020, he started experiencing lower back and left lower extremity pain. Patient has failed to obtain pain relief conservative measures including oral analgesics and physical therapy.  He reports that now his back pain accounts for more than 70% of his pain.  MRI of the lumbar spine on 3/2/2021 revealed annular tears at L4-L5 and L5-S1.  Disc bulges, facet hypertrophy contributing to significant canal and lateral recess stenosis and some neuroforaminal stenosis. L5-S1: Disc bulge, facet hypertrophy.  Moderate canal stenosis, severe bilateral recess stenosis, moderate bilateral neuroforaminal stenosis. Lenka Larson underwent neurosurgical consultation with Dr. Sandor Pryor on 03/24/2021, and was found not to be a surgical candidate at that time.  Dr. Pryor recommended a course of gabapentin, regular exercising, interventional pain management measures and physical therapy. Pain has progressed in intensity over the past months.   Pain Description: constant pain with intermittent exacerbation, described as aching, burning and throbbing sensation.   Radiation of Pain: The pain radiates into the posterior aspect of his left leg, left calf  Pain intensity today: 7/10  Average pain intensity last week: 8/10  Pain intensity ranges from: 7/10 to 10/10  Aggravating factors: Pain increases with bending, twisting, sitting, standing, walking.   Alleviating factors: Pain decreases with lying down   Associated Symptoms:   Patient reports pain, numbness, and weakness in the left lower extremity. Denies " right-sided symptoms  Patient denies any new bladder or bowel problems.   Patient reports difficulties with his balance but denies or recent falls.     Review of previous therapies and additional medical records:  Lenka Larson has already failed the following measures, including:   Conservative Measures: Oral analgesics, topical analgesics, ice, heat, TENs, massage, physical therapy   Interventional Measures: None  Surgical Measures: No history of previous lumbar spine or hip surgery   Lenka Larson underwent neurosurgical consultation with Dr. Sandor Pryor on 03/24/2021, and was found not to be a surgical candidate.  Lenka Larson presents with significant comorbidities including hypertension, GERD  In terms of current analgesics, Lenka Larson takes: Naproxen, gabapentin  I have reviewed Matt Report #[id number] (no CS) consistent with medication reconciliation.  SOAPP: Low Risk    Global Pain Scale 04-13  2021          Pain 21          Feelings 13          Clinical outcomes 7          Activities 13          GPS Total: 54            Review of Diagnostic Studies:    MRI of the lumbar spine 3/2/2021.  I have reviewed the images and the radiology report.  The conus medullaris is seen at T12-L1 and has an unremarkable appearance.  Axial imaging  L1-L2: No significant canal or foraminal stenosis  L2-L3: Disc bulge, facet hypertrophy.  No significant canal stenosis.  Mild bilateral neuroforaminal stenosis  L3-L4: Disc bulge, facet hypertrophy.  No significant canal stenosis.  Mild bilateral neuroforaminal stenosis  L4-L5: Disc bulge, there is a left paracentral disc herniation contributing to moderate left canal stenosis and severe bilateral lateral recess stenosis.  Moderate bilateral neuroforaminal stenosis  L5-S1: Disc bulge, facet hypertrophy.  Moderate canal stenosis, severe bilateral recess stenosis, moderate bilateral neuroforaminal stenosis    Review of Systems   Musculoskeletal: Positive for  "back pain and neck pain.   Neurological: Positive for numbness.   All other systems reviewed and are negative.        Patient Active Problem List   Diagnosis   • HTN (hypertension), benign   • Lumbar stenosis with neurogenic claudication   • Spondylosis of lumbar region without myelopathy or radiculopathy   • Degeneration of lumbar or lumbosacral intervertebral disc   • Lumbar disc herniation with radiculopathy       Past Medical History:   Diagnosis Date   • GERD (gastroesophageal reflux disease)    • Low back pain          Past Surgical History:   Procedure Laterality Date   • KIDNEY STONE SURGERY  2011         Family History   Problem Relation Age of Onset   • Diabetes Mother    • Hypertension Mother    • Heart disease Mother    • Prostate cancer Father          Social History     Socioeconomic History   • Marital status:      Spouse name: Not on file   • Number of children: Not on file   • Years of education: Not on file   • Highest education level: Not on file   Tobacco Use   • Smoking status: Never Smoker   • Smokeless tobacco: Never Used   Vaping Use   • Vaping Use: Never used   Substance and Sexual Activity   • Alcohol use: No   • Drug use: No   • Sexual activity: Defer           Current Outpatient Medications:   •  amLODIPine (NORVASC) 10 MG tablet, TAKE 1 TABLET BY MOUTH ONCE DAILY, Disp: 90 tablet, Rfl: 3  •  gabapentin (NEURONTIN) 300 MG capsule, Take 1 capsule by mouth 3 (Three) Times a Day. 1 nightly for 3 days, 1 twice a day for 3 days, 1 three times a day thereafter, Disp: 90 capsule, Rfl: 0  •  naproxen (NAPROSYN) 500 MG tablet, Take 500 mg by mouth 2 (Two) Times a Day With Meals., Disp: , Rfl:   •  omeprazole (priLOSEC) 20 MG capsule, Take 1 capsule by mouth Daily., Disp: 30 capsule, Rfl: 2      No Known Allergies      /80   Pulse 72   Resp 15   Ht 182.9 cm (72\")   Wt 89.4 kg (197 lb)   SpO2 99%   BMI 26.72 kg/m²       Physical Exam:  Constitutional: Patient appears " well-developed, well-nourished, well-hydrated  HEENT: Head: Normocephalic and atraumatic  Eyes: Conjunctivae and lids are normal  Pupils: Equal, round, reactive to light  Neck: Trachea normal. Neck supple. No JVD present.   Pulmonary: No increased work of breathing or signs of respiratory distress. Auscultation of lungs: Clear to auscultation.   Cardiovascular: Normal rate and rhythm, normal S1 and S2, no murmurs.   Peripheral vascular exam: Femoral: right 2+, left 2+. Posterior tibialis: right 2+ and left 2+. Dorsalis pedis: right 2+ and left 2+. No edema.   Musculoskeletal   Gait and station: Gait evaluation demonstrated a normal gait. Able to walk on heels, toes, tandem walking   Lumbar spine: Passive and active range of motion are limited secondary to pain. Flexion and rotation of the lumbar spine increased and reproduced pain. Lumbar facet joint loading maneuvers are positive.  Charles test and Gaenslen's test are negative   Piriformis maneuvers are negative   Palpation of the bilateral ischial tuberosities, unrevealing   Palpation of the bilateral greater trochanter, unrevealing   Examination of the Iliotibial band: unrevealing   Hip joints: The range of motion of the hip joints is full and without pain   Neurological:   Patient is alert and oriented to person, place, and time.   Speech: Normal.   Cortical function: Normal mental status.   Cranial nerves 2-12: intact.   Reflex Scores:  Right patellar: 1+  Left patellar: 1+  Right Achilles: 1+  Left Achilles: 1+  Motor strength: 5/5  Motor Tone: Normal .   Involuntary movements: None.   Superficial/Primitive Reflexes: Primitive reflexes were absent.   Right Barnhart: Absent  Left Barnhart: Absent  Right ankle clonus: Absent  Left ankle clonus: Absent   Babinsky: Absent  Long tract signs: Negative. Straight leg raising test: Negative on the right. Positive on the left at 30-40 degrees with positive Lasegue. Femoral stretch sign: Negative.   Sensory exam: Intact to  light touch, intact pain and temperature sensation, intact vibration sensation and normal proprioception.   Coordination: Normal finger to nose and heel to shin. Normal balance and negative Romberg's sign   Skin and subcutaneous tissue: Skin is warm and intact. No rash noted. No cyanosis.   Psychiatric: Judgment and insight: Normal. Orientation to person, place and time: Normal. Recent and remote memory: Intact. Mood and affect: Normal.     ASSESSMENT:   1. Lumbar disc herniation with radiculopathy    2. Degeneration of lumbar or lumbosacral intervertebral disc    3. Lumbar stenosis with neurogenic claudication    4. Spondylosis of lumbar region without myelopathy or radiculopathy        PLAN/MEDICAL DECISION MAKING:  Patient reports a longer than 6-month history of progressive lower back and left lower extremity pain, which began without incident.  Patient reports that in October 2020, he started experiencing lower back and left lower extremity pain. Patient has failed to obtain pain relief conservative measures including oral analgesics and physical therapy.  MRI of the lumbar spine on 3/2/2021 revealed annular tears at L4-L5 and L5-S1, and a left for dissemination at L4-L5 contributing to significant canal, lateral recess stenosis and foraminal stenosis. L5-S1: Disc bulge, facet hypertrophy.  Moderate canal stenosis, severe bilateral recess stenosis, moderate bilateral neuroforaminal stenosis. Lenka Larson underwent neurosurgical consultation with Dr. Sandor Pryor on 03/24/2021, and was found not to be a surgical candidate at that time.  Dr. Pryor recommended a course of gabapentin, regular exercising, interventional pain management measures and physical therapy. Pain has progressed in intensity over the past months. Patient has failed to obtain pain relief with conservative measures, as referenced above. I have reviewed all available patient's medical records as well as previous therapies as  referenced above. I had a lengthy conversation with Mr. Lenka Larson regarding his chronic pain condition and potential therapeutic options including risks, benefits, alternative therapies, to name a few. Therefore, I have proposed the following plan:    1. Diagnostic studies: EMG/NCV of the bilateral lower extremities   2. Pharmacological measures: Reviewed and discussed; Patient takes Naproxen, gabapentin. Patient has declined additional pharmacological measures.  I have encouraged the patient to continue on gabapentin  3. Interventional pain management measures: Patient will be scheduled for diagnostic and therapeutic left L4-L5 and left L5-S1 transforaminal epidural steroid injections. We may repeat epidurals depending on patient's outcome.  Patient will follow-up with Dr. Pryor thereafter.  We could consider provocative lumbar discography at the request of Dr. Pryor to help identify the pain generator.  Patient presents with significant degenerative disc disease from L3-L4 through L5-S1  4. Long-term rehabilitation efforts:  A. The patient does not have a history of falls. I did complete a risk assessment for falls  B. Patient will start a comprehensive physical [facility] program at  [facility] with [facility] Derek Anthony for core strengthening, gait and balance training, neurodynamics, myofascial release, cupping, dry needling and Alter-G   C. Start an exercise program such as swimming  D. Contrast therapy: Apply ice-packs for 15-20 minutes, followed by heating pads for 15-20 minutes to affected area   5. The patient has been instructed to contact my office with any questions or difficulties. The patient understands the plan and agrees to proceed accordingly.        Patient Care Team:  Elliot Larson MD as PCP - General (Internal Medicine)     No orders of the defined types were placed in this encounter.        Future Appointments   Date Time Provider Department [place]   7/26/2021 10:15 AM Elliot Larson MD [facility]  MIGUELANGEL Wei MD     EMR Dragon/Transcription disclaimer:  Much of this encounter note is an electronic transcription of spoken language to printed text. Electronic transcription of spoken language may permit erroneous, or at times, nonsensical words or phrases to be inadvertently transcribed. Although I have reviewed the note for such errors, some may still exist.

## 2021-04-08 PROBLEM — M51.37 DEGENERATION OF LUMBAR OR LUMBOSACRAL INTERVERTEBRAL DISC: Status: ACTIVE | Noted: 2021-04-08

## 2021-04-08 PROBLEM — M48.062 LUMBAR STENOSIS WITH NEUROGENIC CLAUDICATION: Status: ACTIVE | Noted: 2021-04-08

## 2021-04-08 PROBLEM — M47.816 SPONDYLOSIS OF LUMBAR REGION WITHOUT MYELOPATHY OR RADICULOPATHY: Status: ACTIVE | Noted: 2021-04-08

## 2021-04-13 ENCOUNTER — OFFICE VISIT (OUTPATIENT)
Dept: PAIN MEDICINE | Facility: CLINIC | Age: 46
End: 2021-04-13

## 2021-04-13 VITALS
BODY MASS INDEX: 26.68 KG/M2 | DIASTOLIC BLOOD PRESSURE: 80 MMHG | SYSTOLIC BLOOD PRESSURE: 112 MMHG | OXYGEN SATURATION: 99 % | HEART RATE: 72 BPM | HEIGHT: 72 IN | WEIGHT: 197 LBS | RESPIRATION RATE: 15 BRPM

## 2021-04-13 DIAGNOSIS — M51.16 LUMBAR DISC HERNIATION WITH RADICULOPATHY: ICD-10-CM

## 2021-04-13 DIAGNOSIS — M51.37 DEGENERATION OF LUMBAR OR LUMBOSACRAL INTERVERTEBRAL DISC: ICD-10-CM

## 2021-04-13 DIAGNOSIS — M47.816 SPONDYLOSIS OF LUMBAR REGION WITHOUT MYELOPATHY OR RADICULOPATHY: ICD-10-CM

## 2021-04-13 DIAGNOSIS — M51.16 LUMBAR DISC HERNIATION WITH RADICULOPATHY: Primary | ICD-10-CM

## 2021-04-13 DIAGNOSIS — M48.062 LUMBAR STENOSIS WITH NEUROGENIC CLAUDICATION: ICD-10-CM

## 2021-04-13 PROCEDURE — 99204 OFFICE O/P NEW MOD 45 MIN: CPT | Performed by: ANESTHESIOLOGY

## 2021-04-19 ENCOUNTER — TELEPHONE (OUTPATIENT)
Dept: PAIN MEDICINE | Facility: CLINIC | Age: 46
End: 2021-04-19

## 2021-04-19 NOTE — TELEPHONE ENCOUNTER
Spoke with pt, advised that covid test is same place same date, just that the time has changed to 130 pm. Pt understood.

## 2021-04-25 ENCOUNTER — APPOINTMENT (OUTPATIENT)
Dept: PREADMISSION TESTING | Facility: HOSPITAL | Age: 46
End: 2021-04-25

## 2021-04-25 PROCEDURE — C9803 HOPD COVID-19 SPEC COLLECT: HCPCS

## 2021-04-25 PROCEDURE — U0004 COV-19 TEST NON-CDC HGH THRU: HCPCS

## 2021-04-26 LAB — SARS-COV-2 RNA NOSE QL NAA+PROBE: NOT DETECTED

## 2021-04-28 ENCOUNTER — OUTSIDE FACILITY SERVICE (OUTPATIENT)
Dept: PAIN MEDICINE | Facility: CLINIC | Age: 46
End: 2021-04-28

## 2021-04-28 PROCEDURE — 64484 NJX AA&/STRD TFRM EPI L/S EA: CPT | Performed by: ANESTHESIOLOGY

## 2021-04-28 PROCEDURE — 64483 NJX AA&/STRD TFRM EPI L/S 1: CPT | Performed by: ANESTHESIOLOGY

## 2021-04-29 ENCOUNTER — TELEPHONE (OUTPATIENT)
Dept: PAIN MEDICINE | Facility: CLINIC | Age: 46
End: 2021-04-29

## 2021-04-29 NOTE — TELEPHONE ENCOUNTER
Spoke with pt and inquired was doing after his procedure yesterday with Dr. Wei. He states that he is doing very well, no issues or concerns to note.Pt asked about where he can go to start physical therapy and I advised of [facility]. Advised I would send the information through a [employer] message. Reminded them of follow up appointment/ next procedure and placed reminder card in the mail.

## 2021-05-11 ENCOUNTER — HOSPITAL ENCOUNTER (OUTPATIENT)
Dept: NEUROLOGY | Facility: HOSPITAL | Age: 46
Discharge: HOME | End: 2021-05-11
Admitting: ANESTHESIOLOGY

## 2021-05-11 DIAGNOSIS — M51.16 LUMBAR DISC HERNIATION WITH RADICULOPATHY: ICD-10-CM

## 2021-05-11 DIAGNOSIS — M48.062 LUMBAR STENOSIS WITH NEUROGENIC CLAUDICATION: ICD-10-CM

## 2021-05-11 PROCEDURE — 95886 MUSC TEST DONE W/N TEST COMP: CPT

## 2021-05-11 PROCEDURE — 95909 NRV CNDJ TST 5-6 STUDIES: CPT

## 2021-05-25 NOTE — PROGRESS NOTES
"Chief Complaint: \"My left leg pain is much better.\"      History of Present Illness:   Patient: Mr. Toya Larson, 45 y.o. male originally referred by Dr. Sandor Pryor in consultation for chronic intractable lower back pain. Patient reports a longer than 6-month history of lower back pain, which began without incident.  Patient was last seen on April 28, 2021, when he underwent diagnostic and therapeutic left L4-L5 and left L5-S1 transforaminal epidural steroid injection, from which he reports experiencing overall 80% relief, and almost complete pain relief of his left leg pain, and his numbness has become intermittent.  He did begin physical therapy.  He underwent EMG/NCV of the bilateral lower extremities, which did reveal chronic S1 radiculopathy on the left, and mild peripheral neuropathy.  He returns today for post procedure follow-up and evaluation.  Pain Description:  intermittent exacerbation, described as aching, burning and throbbing sensation.   Radiation of Pain: The pain radiates no longer radiates into the posterior aspect of his left leg, and left calf, although, he does still experience some intermittent numbness to his left calf especially at night.  Pain intensity today: 6/10  Average pain intensity last week: 5/10  Pain intensity ranges from: 2/10 to 7/10  Aggravating factors: Pain increases with bending, twisting, sitting, standing, walking.  He reports he is able to stand and walk longer since injection.  Alleviating factors: Pain decreases with lying down   Associated Symptoms:   Patient reports the pain and weakness in the left lower extremity has improved since epidural.  He continues to experience some intermittent tingling and numbness to his left calf especially at night.  Denies right-sided symptoms  Patient denies any new bladder or bowel problems.   Patient reports difficulties with his balance but denies or recent falls.     Review of previous therapies and additional " medical records:  Lenka Larson has already failed the following measures, including:   Conservative Measures: Oral analgesics, topical analgesics, ice, heat, TENs, massage, physical therapy   Interventional Measures: 04/28/2021: DxTx left L4-L5 and left L5-S1 transforaminal epidural steroid injection  Surgical Measures: No history of previous lumbar spine or hip surgery   Lenka Larson underwent neurosurgical consultation with Dr. Sandor Pryor on 03/24/2021, and was found not to be a surgical candidate.  Lenka Larson presents with significant comorbidities including hypertension, GERD  In terms of current analgesics, Lenka Larson takes: Naproxen, gabapentin  I have reviewed Matt Report #[id number] consistent with medication reconciliation.  SOAPP: Low Risk    Global Pain Scale 04-13 2021 05-27 2021         Pain 21 15         Feelings 13 5         Clinical outcomes 7 9         Activities 13 19         GPS Total: 54 48           Review of New Diagnostic Studies:  EMG/NCV the of the bilateral lower extremities 5/11/2021: Chronic left S1 radiculopathy, mild peripheral neuropathy.    Review of Diagnostic Studies:    MRI of the lumbar spine 3/2/2021.  Images revealed the conus medullaris is seen at T12-L1 and has an unremarkable appearance.    L1-L2: No significant canal or foraminal stenosis  L2-L3: Disc bulge, facet hypertrophy.  No significant canal stenosis.  Mild bilateral neuroforaminal stenosis  L3-L4: Disc bulge, facet hypertrophy.  No significant canal stenosis.  Mild bilateral neuroforaminal stenosis  L4-L5: Disc bulge, there is a left paracentral disc herniation contributing to moderate left canal stenosis and severe bilateral lateral recess stenosis.  Moderate bilateral neuroforaminal stenosis  L5-S1: Disc bulge, facet hypertrophy.  Moderate canal stenosis, severe bilateral recess stenosis, moderate bilateral neuroforaminal stenosis    Review of Systems   Musculoskeletal: Positive for  "back pain and neck pain.   Neurological: Positive for light-headedness.   All other systems reviewed and are negative.        Patient Active Problem List   Diagnosis   • HTN (hypertension), benign   • Lumbar stenosis with neurogenic claudication   • Spondylosis of lumbar region without myelopathy or radiculopathy   • Degeneration of lumbar or lumbosacral intervertebral disc   • Lumbar disc herniation with radiculopathy       Past Medical History:   Diagnosis Date   • GERD (gastroesophageal reflux disease)    • Low back pain          Past Surgical History:   Procedure Laterality Date   • KIDNEY STONE SURGERY  2011         Family History   Problem Relation Age of Onset   • Diabetes Mother    • Hypertension Mother    • Heart disease Mother    • Prostate cancer Father          Social History     Socioeconomic History   • Marital status:      Spouse name: Not on file   • Number of children: Not on file   • Years of education: Not on file   • Highest education level: Not on file   Tobacco Use   • Smoking status: Never Smoker   • Smokeless tobacco: Never Used   Vaping Use   • Vaping Use: Never used   Substance and Sexual Activity   • Alcohol use: No   • Drug use: No   • Sexual activity: Defer           Current Outpatient Medications:   •  amLODIPine (NORVASC) 10 MG tablet, TAKE 1 TABLET BY MOUTH ONCE DAILY, Disp: 90 tablet, Rfl: 3  •  gabapentin (NEURONTIN) 300 MG capsule, Take 1 capsule by mouth 3 (Three) Times a Day. 1 nightly for 3 days, 1 twice a day for 3 days, 1 three times a day thereafter, Disp: 90 capsule, Rfl: 0  •  naproxen (NAPROSYN) 500 MG tablet, Take 500 mg by mouth 2 (Two) Times a Day With Meals., Disp: , Rfl:   •  omeprazole (priLOSEC) 20 MG capsule, Take 1 capsule by mouth Daily., Disp: 30 capsule, Rfl: 2      No Known Allergies      /86 (BP Location: Left arm, Patient Position: Sitting, Cuff Size: Adult)   Pulse 67   Temp 96.9 °F (36.1 °C)   Ht 182.9 cm (72\")   Wt 90.5 kg (199 lb 9.6 oz) "   SpO2 99%   BMI 27.07 kg/m²       Physical Exam:  Constitutional: Patient appears well-developed, well-nourished, well-hydrated  HEENT: Head: Normocephalic and atraumatic  Eyes: Conjunctivae and lids are normal  Pupils: Equal, round, reactive to light  Neck: Trachea normal. Neck supple. No JVD present.   Pulmonary: No increased work of breathing or signs of respiratory distress. Auscultation of lungs: Clear to auscultation.   Cardiovascular: Normal rate and rhythm, normal S1 and S2, no murmurs.   Peripheral vascular exam: Femoral: right 2+, left 2+. Posterior tibialis: right 2+ and left 2+. Dorsalis pedis: right 2+ and left 2+. No edema.   Musculoskeletal   Gait and station: Gait evaluation demonstrated a normal gait. Able to walk on heels, toes, tandem walking   Lumbar spine: Passive and active range of motion are improved and without pain.  Extension, flexion and rotation of the lumbar spine not increase or reproduce pain.   Lumbar facet joint loading maneuvers are negative.  Charles test and Gaenslen's test are negative   Piriformis maneuvers are negative   Palpation of the bilateral ischial tuberosities, unrevealing   Palpation of the bilateral greater trochanter, unrevealing   Examination of the Iliotibial band: unrevealing   Hip joints: The range of motion of the hip joints is full and without pain   Neurological:   Patient is alert and oriented to person, place, and time.   Speech: Normal.   Cortical function: Normal mental status.   Cranial nerves 2-12: intact.   Reflex Scores:  Right patellar: 1+  Left patellar: 1+  Right Achilles: 1+  Left Achilles: 1+  Motor strength: 5/5  Motor Tone: Normal .   Involuntary movements: None.   Superficial/Primitive Reflexes: Primitive reflexes were absent.   Right Barnhart: Absent  Left Barnhart: Absent  Right ankle clonus: Absent  Left ankle clonus: Absent   Babinsky: Absent  Long tract signs: Negative. Straight leg raising test: Negative on the right.  Mildly positive  on the left eliciting his left calf pain.  Femoral stretch sign: Negative.   Sensory exam: Intact to light touch, intact pain and temperature sensation, intact vibration sensation and normal proprioception.   Coordination: Normal finger to nose and heel to shin. Normal balance and negative Romberg's sign   Skin and subcutaneous tissue: Skin is warm and intact. No rash noted. No cyanosis.   Psychiatric: Judgment and insight: Normal. Orientation to person, place and time: Normal. Recent and remote memory: Intact. Mood and affect: Normal.     ASSESSMENT:   1. Lumbar disc herniation with radiculopathy    2. Lumbar stenosis with neurogenic claudication    3. Degeneration of lumbar or lumbosacral intervertebral disc    4. Spondylosis of lumbar region without myelopathy or radiculopathy        PLAN/MEDICAL DECISION MAKING:  Patient reports a longer than 6-month history of lower back and left lower extremity pain.  Patient has failed to obtain pain relief conservative measures including oral analgesics and physical therapy.  MRI of the lumbar spine on 3/2/2021 revealed annular tears at L4-L5 and L5-S1, and a left for dissemination at L4-L5 contributing to significant canal, lateral recess stenosis and foraminal stenosis. L5-S1: Disc bulge, facet hypertrophy.  Moderate canal stenosis, severe bilateral recess stenosis, moderate bilateral neuroforaminal stenosis.  EMG/NCV of the bilateral lower extremities revealed chronic left S1 radiculopathy, as well as a peripheral neuropathy.  Mr. Larson is doing significantly better since epidural steroid injection, as referenced under HPI.  He still having some intermittent numbness and spasms to his left calf especially at night.  I have encouraged him to begin taking a gabapentin at night, which he has only been taking 1 to 2/day.  If his pain begins to recur he will contact us, and we will discuss an repeat procedure.  Patient has failed to obtain pain relief with conservative  measures, as referenced above. I have reviewed all available patient's medical records as well as previous therapies as referenced above. I had a lengthy conversation with . Toya Larson regarding his chronic pain condition and potential therapeutic options including risks, benefits, alternative therapies, to name a few. Therefore, I have proposed the following plan:  1. Pharmacological measures: Reviewed and discussed; Patient takes Naproxen, gabapentin. Patient has declined additional pharmacological measures.  I have encouraged the patient to continue on gabapentin and increase his dose to 3 times daily as this may help with his complaints of intermittent tingling and numbness.  2. Interventional pain management measures: None indicated at this time.  Follow up on as needed basis.  If pain recurs we may consider repeating left L4-L5 and left L5-S1 transforaminal epidural steroid injections. We may repeat epidurals depending on patient's outcome.  Patient will follow-up with Dr. Pryor thereafter.  We could consider provocative lumbar discography at the request of Dr. Pryor to help identify the pain generator.  Patient presents with significant degenerative disc disease from L3-L4 through L5-S1  3. Long-term rehabilitation efforts:  A. The patient does not have a history of falls. I did complete a risk assessment for falls  B. Patient will continue a comprehensive physical therapy program  core strengthening, gait and balance training, neurodynamics, myofascial release, cupping, dry needling and Alter-G   C. Start an exercise program such as swimming  D. Contrast therapy: Apply ice-packs for 15-20 minutes, followed by heating pads for 15-20 minutes to affected area   4. The patient has been instructed to contact my office with any questions or difficulties. The patient understands the plan and agrees to proceed accordingly.        Patient Care Team:  Elloit Larson MD as PCP - General (Internal Medicine)      No orders of the defined types were placed in this encounter.        Future Appointments   Date Time Provider Department Center   7/26/2021 10:15 AM Elliot Larson MD [facility]         NOLAN Castanon     EMR Dragon/Transcription disclaimer:  Much of this encounter note is an electronic transcription of spoken language to printed text. Electronic transcription of spoken language may permit erroneous, or at times, nonsensical words or phrases to be inadvertently transcribed. Although I have reviewed the note for such errors, some may still exist.

## 2021-05-27 ENCOUNTER — OFFICE VISIT (OUTPATIENT)
Dept: PAIN MEDICINE | Facility: CLINIC | Age: 46
End: 2021-05-27

## 2021-05-27 VITALS
HEIGHT: 72 IN | SYSTOLIC BLOOD PRESSURE: 130 MMHG | WEIGHT: 199.6 LBS | TEMPERATURE: 96.9 F | DIASTOLIC BLOOD PRESSURE: 86 MMHG | BODY MASS INDEX: 27.04 KG/M2 | OXYGEN SATURATION: 99 % | HEART RATE: 67 BPM

## 2021-05-27 DIAGNOSIS — M51.37 DEGENERATION OF LUMBAR OR LUMBOSACRAL INTERVERTEBRAL DISC: ICD-10-CM

## 2021-05-27 DIAGNOSIS — M51.16 LUMBAR DISC HERNIATION WITH RADICULOPATHY: ICD-10-CM

## 2021-05-27 DIAGNOSIS — M47.816 SPONDYLOSIS OF LUMBAR REGION WITHOUT MYELOPATHY OR RADICULOPATHY: ICD-10-CM

## 2021-05-27 DIAGNOSIS — M48.062 LUMBAR STENOSIS WITH NEUROGENIC CLAUDICATION: ICD-10-CM

## 2021-05-27 PROCEDURE — 99213 OFFICE O/P EST LOW 20 MIN: CPT | Performed by: NURSE PRACTITIONER

## 2021-07-06 ENCOUNTER — TRANSITIONAL CARE MANAGEMENT TELEPHONE ENCOUNTER (OUTPATIENT)
Dept: CALL CENTER | Facility: HOSPITAL | Age: 46
End: 2021-07-06

## 2021-07-06 ENCOUNTER — TELEPHONE (OUTPATIENT)
Dept: INTERNAL MEDICINE | Facility: CLINIC | Age: 46
End: 2021-07-06

## 2021-07-06 ENCOUNTER — READMISSION MANAGEMENT (OUTPATIENT)
Dept: CALL CENTER | Facility: HOSPITAL | Age: 46
End: 2021-07-06

## 2021-07-06 NOTE — OUTREACH NOTE
Prep Survey      Responses   [facility] facility patient discharged from?  Non-BH   Is LACE score < 7 ?  Non-BH Discharge   Emergency Room discharge w/ pulse ox?  No   Eligibility  [facility]   Date of Discharge  07/04/21   Discharge diagnosis  unavailable   Does the patient have one of the following disease processes/diagnoses(primary or secondary)?  Non-BH Discharge   Prep survey completed?  Yes          Shari Navas RN

## 2021-07-06 NOTE — TELEPHONE ENCOUNTER
Caller: Toya Larson    Relationship to patient: Self    Best call back number: [phone]    New or established patient?  [] New  [x] Established    Date of discharge: 07/04/2021    Facility discharged from: [facility]    Diagnosis/Symptoms:     Length of stay (If applicable):     Specialty Only: Did you see a [facility] health provider?    [] Yes  [x] No  If so, who?

## 2021-07-06 NOTE — OUTREACH NOTE
"Call Center TCM Note      Responses   [facility] patient discharged from?  Non-   Does the patient have one of the following disease processes/diagnoses(primary or secondary)?  Non- Discharge   TCM attempt successful?  Yes   Discharge diagnosis  unavailable   Meds reviewed with patient/caregiver?  N/A   Does the patient have all medications ordered at discharge?  N/A   Prescription comments  Pt not at all forthcoming but did state \"No\" when I asked if there were any changes or additions to meds while at [facility].    Does the patient have a primary care provider?   Yes   Does the patient have an appointment with their PCP within 7 days of discharge?  Yes   Comments regarding PCP  TCM FWP with PCP ofc is tomorrow 07/07/2021.   Has the patient kept scheduled appointments due by today?  N/A   Has home health visited the patient within 72 hours of discharge?  N/A   Psychosocial issues?  No   Did the patient receive a copy of their discharge instructions?  Yes   Nursing interventions  Reviewed instructions with patient   What is the patient's perception of their health status since discharge?  Improving   Is the patient/caregiver able to teach back signs and symptoms related to disease process for when to call PCP?  Yes   Is the patient/caregiver able to teach back signs and symptoms related to disease process for when to call 911?  Yes   Is the patient/caregiver able to teach back the hierarchy of who to call/visit for symptoms/problems? PCP, Specialist, Home health nurse, Urgent Care, ED, 911  Yes   If the patient is a current smoker, are they able to teach back resources for cessation?  Not a smoker   TCM call completed?  Yes   Wrap up additional comments  Short call, pt would barely answer my questions. There are no hospital notes from [facility] in chart at time of call. Pt did say \"no\" to med changes, states he feels fine. Did confirm he is seeing PCP ofc tomorrow.           Sylvia Grimes MA    7/6/2021, " 12:50 EDT

## 2021-07-15 RX ORDER — SODIUM, POTASSIUM,MAG SULFATES 17.5-3.13G
2 SOLUTION, RECONSTITUTED, ORAL ORAL TAKE AS DIRECTED
Qty: 354 ML | Refills: 0 | Status: SHIPPED | OUTPATIENT
Start: 2021-07-15

## 2021-07-21 ENCOUNTER — APPOINTMENT (OUTPATIENT)
Dept: PREADMISSION TESTING | Facility: HOSPITAL | Age: 46
End: 2021-07-21

## 2021-07-21 LAB — SARS-COV-2 RNA NOSE QL NAA+PROBE: NOT DETECTED

## 2021-07-21 PROCEDURE — U0004 COV-19 TEST NON-CDC HGH THRU: HCPCS

## 2021-07-21 PROCEDURE — C9803 HOPD COVID-19 SPEC COLLECT: HCPCS

## 2021-07-23 ENCOUNTER — OUTSIDE FACILITY SERVICE (OUTPATIENT)
Dept: GASTROENTEROLOGY | Facility: CLINIC | Age: 46
End: 2021-07-23

## 2021-07-23 PROCEDURE — 43239 EGD BIOPSY SINGLE/MULTIPLE: CPT | Performed by: INTERNAL MEDICINE

## 2021-07-23 PROCEDURE — 88312 SPECIAL STAINS GROUP 1: CPT | Performed by: INTERNAL MEDICINE

## 2021-07-23 PROCEDURE — 88305 TISSUE EXAM BY PATHOLOGIST: CPT | Performed by: INTERNAL MEDICINE

## 2021-07-23 PROCEDURE — 45385 COLONOSCOPY W/LESION REMOVAL: CPT | Performed by: INTERNAL MEDICINE

## 2021-07-26 ENCOUNTER — LAB REQUISITION (OUTPATIENT)
Dept: LAB | Facility: HOSPITAL | Age: 46
End: 2021-07-26

## 2021-07-26 DIAGNOSIS — Z12.11 ENCOUNTER FOR SCREENING FOR MALIGNANT NEOPLASM OF COLON: ICD-10-CM

## 2021-07-27 LAB
CYTO UR: NORMAL
LAB AP CASE REPORT: NORMAL
LAB AP CLINICAL INFORMATION: NORMAL
PATH REPORT.FINAL DX SPEC: NORMAL
PATH REPORT.GROSS SPEC: NORMAL

## 2021-09-01 ENCOUNTER — OFFICE VISIT (OUTPATIENT)
Dept: NEUROSURGERY | Facility: CLINIC | Age: 46
End: 2021-09-01

## 2021-09-01 VITALS
SYSTOLIC BLOOD PRESSURE: 130 MMHG | HEIGHT: 72 IN | DIASTOLIC BLOOD PRESSURE: 80 MMHG | TEMPERATURE: 97.6 F | WEIGHT: 199.4 LBS | BODY MASS INDEX: 27.01 KG/M2

## 2021-09-01 DIAGNOSIS — M54.42 CHRONIC BILATERAL LOW BACK PAIN WITH LEFT-SIDED SCIATICA: Primary | ICD-10-CM

## 2021-09-01 DIAGNOSIS — G89.29 CHRONIC BILATERAL LOW BACK PAIN WITH LEFT-SIDED SCIATICA: Primary | ICD-10-CM

## 2021-09-01 DIAGNOSIS — M51.37 DEGENERATION OF LUMBAR OR LUMBOSACRAL INTERVERTEBRAL DISC: ICD-10-CM

## 2021-09-01 PROCEDURE — 99213 OFFICE O/P EST LOW 20 MIN: CPT | Performed by: PHYSICIAN ASSISTANT

## 2021-09-01 RX ORDER — CHLORTHALIDONE 25 MG/1
TABLET ORAL
COMMUNITY
Start: 2021-08-10

## 2021-09-01 RX ORDER — TEMAZEPAM 15 MG/1
CAPSULE ORAL
COMMUNITY
Start: 2021-07-19

## 2021-09-01 RX ORDER — OMEPRAZOLE 40 MG/1
CAPSULE, DELAYED RELEASE ORAL
COMMUNITY
Start: 2021-08-10

## 2021-09-01 RX ORDER — AMITRIPTYLINE HYDROCHLORIDE 25 MG/1
TABLET, FILM COATED ORAL
COMMUNITY
Start: 2021-08-10

## 2021-09-01 RX ORDER — GABAPENTIN 300 MG/1
300 CAPSULE ORAL 3 TIMES DAILY
Qty: 90 CAPSULE | Refills: 0 | Status: SHIPPED | OUTPATIENT
Start: 2021-09-01

## 2021-09-01 NOTE — PROGRESS NOTES
Subjective     Chief Complaint: Low back pain    Patient ID: Toya Larson is a 45 y.o. male is here today for follow-up.    History of Present Illness    This is a 45-year-old gentleman who was seen in our office several months ago with low back pain and left-sided S1 radiculopathy.  He was referred for physical therapy and pain management.  He underwent 1 injection with PM which alleviated his lower extremity pain for some time.  2 weeks ago, his left leg pain returned.  He states he ran out of his gabapentin a few weeks ago and has not been taking it since then.  Pain radiates into the left calf.  Denies weakness, saddle anesthesia or bowel or bladder dysfunction.    The following portions of the patient's history were reviewed and updated as appropriate: allergies, current medications, past family history, past medical history, past social history, past surgical history and problem list.    Family history:   Family History   Problem Relation Age of Onset   • Diabetes Mother    • Hypertension Mother    • Heart disease Mother    • Prostate cancer Father        Social history:   Social History     Socioeconomic History   • Marital status:      Spouse name: Not on file   • Number of children: Not on file   • Years of education: Not on file   • Highest education level: Not on file   Tobacco Use   • Smoking status: Never Smoker   • Smokeless tobacco: Never Used   Vaping Use   • Vaping Use: Never used   Substance and Sexual Activity   • Alcohol use: No   • Drug use: No   • Sexual activity: Defer       Review of Systems   Constitutional: Negative for activity change, appetite change, chills, diaphoresis, fatigue, fever and unexpected weight change.   HENT: Negative for congestion, dental problem, drooling, ear discharge, ear pain, facial swelling, hearing loss, mouth sores, nosebleeds, postnasal drip, rhinorrhea, sinus pressure, sinus pain, sneezing, sore throat, tinnitus, trouble swallowing and voice change.   "  Eyes: Negative for photophobia, pain, discharge, redness, itching and visual disturbance.   Respiratory: Negative for apnea, cough, choking, chest tightness, shortness of breath, wheezing and stridor.    Cardiovascular: Negative for chest pain, palpitations and leg swelling.   Gastrointestinal: Negative for abdominal distention, abdominal pain, anal bleeding, blood in stool, constipation, diarrhea, nausea, rectal pain and vomiting.   Endocrine: Negative for cold intolerance, heat intolerance, polydipsia, polyphagia and polyuria.   Genitourinary: Negative for decreased urine volume, difficulty urinating, dysuria, enuresis, flank pain, frequency, genital sores, hematuria and urgency.   Musculoskeletal: Positive for back pain (leg pain). Negative for arthralgias, gait problem, joint swelling, myalgias, neck pain and neck stiffness.   Skin: Negative for color change, pallor, rash and wound.   Allergic/Immunologic: Negative for environmental allergies, food allergies and immunocompromised state.   Neurological: Negative for dizziness, tremors, seizures, syncope, facial asymmetry, speech difficulty, weakness, light-headedness, numbness and headaches.   Hematological: Negative for adenopathy. Does not bruise/bleed easily.   Psychiatric/Behavioral: Negative for agitation, behavioral problems, confusion, decreased concentration, dysphoric mood, hallucinations, self-injury, sleep disturbance and suicidal ideas. The patient is not nervous/anxious and is not hyperactive.        Objective   Blood pressure 130/80, temperature 97.6 °F (36.4 °C), height 182.9 cm (72\"), weight 90.4 kg (199 lb 6.4 oz).  Body mass index is 27.04 kg/m².  Patient's Body mass index is 27.04 kg/m². indicating that he is overweight (BMI 25-29.9). Obesity-related health conditions include the following: osteoarthritis. Obesity is unchanged. BMI is is above average; BMI management plan is completed.  Wediscussed portion control and increasing " exercise..      Physical Exam  Constitutional:       Appearance: Normal appearance. He is not ill-appearing or diaphoretic.   HENT:      Head: Normocephalic and atraumatic.   Eyes:      Pupils: Pupils are equal, round, and reactive to light.   Pulmonary:      Effort: Pulmonary effort is normal.   Musculoskeletal:      Cervical back: Neck supple.      Lumbar back: Negative right straight leg raise test and negative left straight leg raise test.   Skin:     General: Skin is warm and dry.   Neurological:      General: No focal deficit present.      Mental Status: He is alert and oriented to person, place, and time.      GCS: GCS eye subscore is 4. GCS verbal subscore is 5. GCS motor subscore is 6.      Sensory: Sensation is intact.      Motor: Motor function is intact.      Gait: Gait is intact. Tandem walk normal.      Deep Tendon Reflexes:      Reflex Scores:       Patellar reflexes are 1+ on the right side and 1+ on the left side.       Achilles reflexes are 1+ on the right side and 1+ on the left side.  Psychiatric:         Mood and Affect: Mood normal.         Behavior: Behavior normal.           Assessment/Plan     This is a 45-year-old gentleman with chronic low back pain and left leg pain.  His left S1 radiculopathy had been controlled with conservative treatment options, however he ran out of his gabapentin and his leg pain has returned.  I have called him in a new prescription for gabapentin 3 mg 3 times daily.  If symptoms persist, I would recommend pursuing another injection with pain management as this resolved his pain previously.      His primary complaint is that of low back pain, we discussed starting an exercise program and remaining active, he lives a sedentary lifestyle.  I reviewed the signs and symptoms of lumbosacral radiculopathy and cauda equina syndrome with him.  If his symptoms persist despite gabapentin and exhausting treatment with interventional pain management, he may be a candidate for  a surgical decompression.  However, this would be to address his leg pain and as mentioned above his primary complaint is that of low back pain.  I will follow-up with him in several months, or sooner if clinically indicated.    Diagnoses and all orders for this visit:    1. Chronic bilateral low back pain with left-sided sciatica (Primary)  -     gabapentin (NEURONTIN) 300 MG capsule; Take 1 capsule by mouth 3 (Three) Times a Day. 1 nightly for 3 days, 1 twice a day for 3 days, 1 three times a day thereafter  Dispense: 90 capsule; Refill: 0    2. Degeneration of lumbar or lumbosacral intervertebral disc        Return in about 6 months (around 3/1/2022), or if symptoms worsen or fail to improve.             Nabila Olivares PA-C